# Patient Record
Sex: MALE | NOT HISPANIC OR LATINO | Employment: UNEMPLOYED | URBAN - METROPOLITAN AREA
[De-identification: names, ages, dates, MRNs, and addresses within clinical notes are randomized per-mention and may not be internally consistent; named-entity substitution may affect disease eponyms.]

---

## 2018-01-21 ENCOUNTER — HOSPITAL ENCOUNTER (OUTPATIENT)
Dept: MEDSURG UNIT | Facility: HOSPITAL | Age: 33
End: 2018-01-22
Attending: INTERNAL MEDICINE | Admitting: INTERNAL MEDICINE

## 2018-01-21 LAB — PHENYTOIN SERPL-MCNC: 8.7 MCG/ML (ref 10–20)

## 2018-01-22 LAB
ABS NEUT (OLG): 3.71 X10(3)/MCL (ref 2.1–9.2)
ALBUMIN SERPL-MCNC: 3.2 GM/DL (ref 3.4–5)
ALBUMIN/GLOB SERPL: 1 RATIO (ref 1–2)
ALP SERPL-CCNC: 108 UNIT/L (ref 45–117)
ALT SERPL-CCNC: 42 UNIT/L (ref 12–78)
AST SERPL-CCNC: 21 UNIT/L (ref 15–37)
BASOPHILS # BLD AUTO: 0.12 X10(3)/MCL
BASOPHILS NFR BLD AUTO: 2 % (ref 0–1)
BILIRUB SERPL-MCNC: 0.3 MG/DL (ref 0.2–1)
BILIRUBIN DIRECT+TOT PNL SERPL-MCNC: <0.1 MG/DL
BILIRUBIN DIRECT+TOT PNL SERPL-MCNC: ABNORMAL MG/DL
BUN SERPL-MCNC: 19 MG/DL (ref 7–18)
CALCIUM SERPL-MCNC: 8.8 MG/DL (ref 8.5–10.1)
CHLORIDE SERPL-SCNC: 103 MMOL/L (ref 98–107)
CO2 SERPL-SCNC: 28 MMOL/L (ref 21–32)
CREAT SERPL-MCNC: 0.8 MG/DL (ref 0.6–1.3)
EOSINOPHIL # BLD AUTO: 0.34 10*3/UL
EOSINOPHIL NFR BLD AUTO: 5 % (ref 0–5)
ERYTHROCYTE [DISTWIDTH] IN BLOOD BY AUTOMATED COUNT: 12.7 % (ref 11.5–14.5)
GLOBULIN SER-MCNC: 3.9 GM/ML (ref 2.3–3.5)
GLUCOSE SERPL-MCNC: 80 MG/DL (ref 74–106)
HCT VFR BLD AUTO: 46.3 % (ref 40–51)
HGB BLD-MCNC: 15.9 GM/DL (ref 13.5–17.5)
IMM GRANULOCYTES # BLD AUTO: 0.03 10*3/UL
IMM GRANULOCYTES NFR BLD AUTO: 0 %
LYMPHOCYTES # BLD AUTO: 2.33 X10(3)/MCL
LYMPHOCYTES NFR BLD AUTO: 32 % (ref 15–40)
MCH RBC QN AUTO: 31.1 PG (ref 26–34)
MCHC RBC AUTO-ENTMCNC: 34.3 GM/DL (ref 31–37)
MCV RBC AUTO: 90.4 FL (ref 80–100)
MONOCYTES # BLD AUTO: 0.76 X10(3)/MCL
MONOCYTES NFR BLD AUTO: 10 % (ref 4–12)
NEUTROPHILS # BLD AUTO: 3.71 X10(3)/MCL
NEUTROPHILS NFR BLD AUTO: 51 X10(3)/MCL
PLATELET # BLD AUTO: 279 X10(3)/MCL (ref 130–400)
PMV BLD AUTO: 9.5 FL (ref 7.4–10.4)
POTASSIUM SERPL-SCNC: 4.1 MMOL/L (ref 3.5–5.1)
PROT SERPL-MCNC: 7.1 GM/DL (ref 6.4–8.2)
RBC # BLD AUTO: 5.12 X10(6)/MCL (ref 4.5–5.9)
SODIUM SERPL-SCNC: 138 MMOL/L (ref 136–145)
WBC # SPEC AUTO: 7.3 X10(3)/MCL (ref 4.5–11)

## 2018-04-09 ENCOUNTER — HOSPITAL ENCOUNTER (OUTPATIENT)
Dept: EMERGENCY MEDICINE | Facility: HOSPITAL | Age: 33
End: 2018-04-09
Attending: INTERNAL MEDICINE | Admitting: INTERNAL MEDICINE

## 2018-04-09 LAB
ABS NEUT (OLG): 6.74 X10(3)/MCL (ref 2.1–9.2)
ALBUMIN SERPL-MCNC: 3.4 GM/DL (ref 3.4–5)
ALBUMIN/GLOB SERPL: 1 RATIO (ref 1–2)
ALP SERPL-CCNC: 128 UNIT/L (ref 45–117)
ALT SERPL-CCNC: 64 UNIT/L (ref 12–78)
AMPHET UR QL SCN: NEGATIVE
APPEARANCE, UA: CLEAR
AST SERPL-CCNC: 30 UNIT/L (ref 15–37)
BACTERIA #/AREA URNS AUTO: ABNORMAL /[HPF]
BARBITURATE SCN PRESENT UR: NEGATIVE
BASOPHILS # BLD AUTO: 0.08 X10(3)/MCL
BASOPHILS NFR BLD AUTO: 1 %
BENZODIAZ UR QL SCN: NEGATIVE
BILIRUB SERPL-MCNC: 0.3 MG/DL (ref 0.2–1)
BILIRUB UR QL STRIP: NEGATIVE
BILIRUBIN DIRECT+TOT PNL SERPL-MCNC: 0.1 MG/DL
BILIRUBIN DIRECT+TOT PNL SERPL-MCNC: 0.2 MG/DL
BUN SERPL-MCNC: 10 MG/DL (ref 7–18)
CALCIUM SERPL-MCNC: 8.6 MG/DL (ref 8.5–10.1)
CANNABINOIDS UR QL SCN: NEGATIVE
CHLORIDE SERPL-SCNC: 106 MMOL/L (ref 98–107)
CK MB SERPL-MCNC: 1.1 NG/ML (ref 1–3.6)
CK SERPL-CCNC: 244 UNIT/L (ref 39–308)
CK SERPL-CCNC: 307 UNIT/L (ref 39–308)
CO2 SERPL-SCNC: 27 MMOL/L (ref 21–32)
COCAINE UR QL SCN: NEGATIVE
COLOR UR: COLORLESS
CREAT SERPL-MCNC: 0.8 MG/DL (ref 0.6–1.3)
EOSINOPHIL # BLD AUTO: 0.06 X10(3)/MCL
EOSINOPHIL NFR BLD AUTO: 1 %
ERYTHROCYTE [DISTWIDTH] IN BLOOD BY AUTOMATED COUNT: 13.8 % (ref 11.5–14.5)
ETHANOL SERPL-MCNC: <3 MG/DL
GLOBULIN SER-MCNC: 3.7 GM/ML (ref 2.3–3.5)
GLUCOSE (UA): NORMAL
GLUCOSE SERPL-MCNC: 99 MG/DL (ref 74–106)
HCT VFR BLD AUTO: 40.5 % (ref 40–51)
HGB BLD-MCNC: 13.5 GM/DL (ref 13.5–17.5)
HGB UR QL STRIP: NEGATIVE
HYALINE CASTS #/AREA URNS LPF: ABNORMAL /[LPF]
IMM GRANULOCYTES # BLD AUTO: 0.03 10*3/UL
IMM GRANULOCYTES NFR BLD AUTO: 0 %
KETONES UR QL STRIP: NEGATIVE
LEUKOCYTE ESTERASE UR QL STRIP: NEGATIVE
LYMPHOCYTES # BLD AUTO: 1.72 X10(3)/MCL
LYMPHOCYTES NFR BLD AUTO: 18 % (ref 13–40)
MAGNESIUM SERPL-MCNC: 2 MG/DL (ref 1.8–2.4)
MCH RBC QN AUTO: 31.2 PG (ref 26–34)
MCHC RBC AUTO-ENTMCNC: 33.3 GM/DL (ref 31–37)
MCV RBC AUTO: 93.5 FL (ref 80–100)
MONOCYTES # BLD AUTO: 0.78 X10(3)/MCL
MONOCYTES NFR BLD AUTO: 8 % (ref 4–12)
NEUTROPHILS # BLD AUTO: 6.74 X10(3)/MCL
NEUTROPHILS NFR BLD AUTO: 72 X10(3)/MCL
NITRITE UR QL STRIP: NEGATIVE
OPIATES UR QL SCN: NEGATIVE
PCP UR QL: NEGATIVE
PH UR STRIP.AUTO: 7.5 [PH] (ref 5–8)
PH UR STRIP: 7.5 [PH] (ref 4.5–8)
PHOSPHATE SERPL-MCNC: 2.5 MG/DL (ref 2.5–4.9)
PLATELET # BLD AUTO: 263 X10(3)/MCL (ref 130–400)
PMV BLD AUTO: 9.1 FL (ref 7.4–10.4)
POC TROPONIN: 0 NG/ML (ref 0–0.08)
POTASSIUM SERPL-SCNC: 3.2 MMOL/L (ref 3.5–5.1)
PROT SERPL-MCNC: 7.1 GM/DL (ref 6.4–8.2)
PROT UR QL STRIP: NEGATIVE
RBC # BLD AUTO: 4.33 X10(6)/MCL (ref 4.5–5.9)
RBC #/AREA URNS AUTO: ABNORMAL /[HPF]
SODIUM SERPL-SCNC: 138 MMOL/L (ref 136–145)
SP GR UR STRIP: 1 (ref 1–1.03)
SQUAMOUS #/AREA URNS LPF: ABNORMAL /[LPF]
TEMPERATURE, URINE (OHS): 25 DEGC (ref 20–25)
UROBILINOGEN UR STRIP-ACNC: NORMAL
WBC # SPEC AUTO: 9.4 X10(3)/MCL (ref 4.5–11)
WBC #/AREA URNS AUTO: ABNORMAL /HPF

## 2020-02-27 ENCOUNTER — HOSPITAL ENCOUNTER (OUTPATIENT)
Dept: MEDSURG UNIT | Facility: HOSPITAL | Age: 35
End: 2020-02-28
Attending: SURGERY | Admitting: SURGERY

## 2020-02-27 LAB
ABS NEUT (OLG): 4.94 X10(3)/MCL (ref 2.1–9.2)
ALBUMIN SERPL-MCNC: 3.4 GM/DL (ref 3.4–5)
ALBUMIN/GLOB SERPL: 0.9 RATIO (ref 1.1–2)
ALP SERPL-CCNC: 90 UNIT/L (ref 45–117)
ALT SERPL-CCNC: 20 UNIT/L (ref 12–78)
APTT PPP: 28.6 SECOND(S) (ref 23.3–37)
AST SERPL-CCNC: 9 UNIT/L (ref 15–37)
BASOPHILS # BLD AUTO: 0.1 X10(3)/MCL (ref 0–0.2)
BASOPHILS NFR BLD AUTO: 1 %
BILIRUB SERPL-MCNC: 0.2 MG/DL (ref 0.2–1)
BILIRUBIN DIRECT+TOT PNL SERPL-MCNC: <0.1 MG/DL (ref 0–0.2)
BILIRUBIN DIRECT+TOT PNL SERPL-MCNC: ABNORMAL MG/DL
BUN SERPL-MCNC: 15 MG/DL (ref 7–18)
CALCIUM SERPL-MCNC: 8.9 MG/DL (ref 8.5–10.1)
CHLORIDE SERPL-SCNC: 108 MMOL/L (ref 98–107)
CO2 SERPL-SCNC: 29 MMOL/L (ref 21–32)
CREAT SERPL-MCNC: 0.9 MG/DL (ref 0.6–1.3)
EOSINOPHIL # BLD AUTO: 0.2 X10(3)/MCL (ref 0–0.9)
EOSINOPHIL NFR BLD AUTO: 2 %
ERYTHROCYTE [DISTWIDTH] IN BLOOD BY AUTOMATED COUNT: 13 % (ref 11.5–14.5)
GLOBULIN SER-MCNC: 3.6 GM/ML (ref 2.3–3.5)
GLUCOSE SERPL-MCNC: 95 MG/DL (ref 74–106)
HCT VFR BLD AUTO: 49.4 % (ref 40–51)
HGB BLD-MCNC: 16.5 GM/DL (ref 13.5–17.5)
IMM GRANULOCYTES # BLD AUTO: 0.02 10*3/UL
IMM GRANULOCYTES NFR BLD AUTO: 0 %
INR PPP: 1 (ref 0.9–1.2)
LYMPHOCYTES # BLD AUTO: 1.7 X10(3)/MCL (ref 0.6–4.6)
LYMPHOCYTES NFR BLD AUTO: 22 %
MCH RBC QN AUTO: 31.1 PG (ref 26–34)
MCHC RBC AUTO-ENTMCNC: 33.4 GM/DL (ref 31–37)
MCV RBC AUTO: 93.2 FL (ref 80–100)
MONOCYTES # BLD AUTO: 0.7 X10(3)/MCL (ref 0.1–1.3)
MONOCYTES NFR BLD AUTO: 9 %
NEUTROPHILS # BLD AUTO: 4.94 X10(3)/MCL (ref 2.1–9.2)
NEUTROPHILS NFR BLD AUTO: 65 %
PLATELET # BLD AUTO: 224 X10(3)/MCL (ref 130–400)
PMV BLD AUTO: 9.8 FL (ref 7.4–10.4)
POTASSIUM SERPL-SCNC: 3.9 MMOL/L (ref 3.5–5.1)
PROT SERPL-MCNC: 7 GM/DL (ref 6.4–8.2)
PROTHROMBIN TIME: 13.1 SECOND(S) (ref 11.9–14.4)
RBC # BLD AUTO: 5.3 X10(6)/MCL (ref 4.5–5.9)
SODIUM SERPL-SCNC: 140 MMOL/L (ref 136–145)
WBC # SPEC AUTO: 7.6 X10(3)/MCL (ref 4.5–11)

## 2020-02-28 LAB
ABS NEUT (OLG): 4.22 X10(3)/MCL (ref 2.1–9.2)
ALBUMIN SERPL-MCNC: 3 GM/DL (ref 3.4–5)
ALBUMIN/GLOB SERPL: 1 RATIO (ref 1.1–2)
ALP SERPL-CCNC: 76 UNIT/L (ref 45–117)
ALT SERPL-CCNC: 17 UNIT/L (ref 12–78)
AST SERPL-CCNC: 7 UNIT/L (ref 15–37)
BASOPHILS # BLD AUTO: 0.1 X10(3)/MCL (ref 0–0.2)
BASOPHILS NFR BLD AUTO: 1 %
BILIRUB SERPL-MCNC: 0.4 MG/DL (ref 0.2–1)
BILIRUBIN DIRECT+TOT PNL SERPL-MCNC: 0.1 MG/DL (ref 0–0.2)
BILIRUBIN DIRECT+TOT PNL SERPL-MCNC: 0.3 MG/DL
BUN SERPL-MCNC: 14 MG/DL (ref 7–18)
CALCIUM SERPL-MCNC: 8.7 MG/DL (ref 8.5–10.1)
CHLORIDE SERPL-SCNC: 109 MMOL/L (ref 98–107)
CO2 SERPL-SCNC: 30 MMOL/L (ref 21–32)
CREAT SERPL-MCNC: 0.8 MG/DL (ref 0.6–1.3)
EOSINOPHIL # BLD AUTO: 0.3 X10(3)/MCL (ref 0–0.9)
EOSINOPHIL NFR BLD AUTO: 4 %
ERYTHROCYTE [DISTWIDTH] IN BLOOD BY AUTOMATED COUNT: 12.9 % (ref 11.5–14.5)
GLOBULIN SER-MCNC: 3.1 GM/ML (ref 2.3–3.5)
GLUCOSE SERPL-MCNC: 82 MG/DL (ref 74–106)
HCT VFR BLD AUTO: 47.3 % (ref 40–51)
HGB BLD-MCNC: 15.5 GM/DL (ref 13.5–17.5)
IMM GRANULOCYTES # BLD AUTO: 0.01 10*3/UL
IMM GRANULOCYTES NFR BLD AUTO: 0 %
INR PPP: 1.05 (ref 0.9–1.2)
LYMPHOCYTES # BLD AUTO: 2.7 X10(3)/MCL (ref 0.6–4.6)
LYMPHOCYTES NFR BLD AUTO: 33 %
MCH RBC QN AUTO: 30.6 PG (ref 26–34)
MCHC RBC AUTO-ENTMCNC: 32.8 GM/DL (ref 31–37)
MCV RBC AUTO: 93.3 FL (ref 80–100)
MONOCYTES # BLD AUTO: 0.8 X10(3)/MCL (ref 0.1–1.3)
MONOCYTES NFR BLD AUTO: 10 %
NEUTROPHILS # BLD AUTO: 4.22 X10(3)/MCL (ref 2.1–9.2)
NEUTROPHILS NFR BLD AUTO: 52 %
PLATELET # BLD AUTO: 225 X10(3)/MCL (ref 130–400)
PMV BLD AUTO: 10.3 FL (ref 7.4–10.4)
POTASSIUM SERPL-SCNC: 3.9 MMOL/L (ref 3.5–5.1)
PROT SERPL-MCNC: 6.1 GM/DL (ref 6.4–8.2)
PROTHROMBIN TIME: 13.6 SECOND(S) (ref 11.9–14.4)
RBC # BLD AUTO: 5.07 X10(6)/MCL (ref 4.5–5.9)
SODIUM SERPL-SCNC: 142 MMOL/L (ref 136–145)
WBC # SPEC AUTO: 8.1 X10(3)/MCL (ref 4.5–11)

## 2020-04-27 ENCOUNTER — HOSPITAL ENCOUNTER (OUTPATIENT)
Dept: MEDSURG UNIT | Facility: HOSPITAL | Age: 35
End: 2020-04-29
Attending: SURGERY | Admitting: SURGERY

## 2020-04-27 LAB
ABS NEUT (OLG): 7.95 X10(3)/MCL (ref 2.1–9.2)
ALBUMIN SERPL-MCNC: 3.7 GM/DL (ref 3.4–5)
ALBUMIN/GLOB SERPL: 0.9 RATIO (ref 1.1–2)
ALP SERPL-CCNC: 116 UNIT/L (ref 45–117)
ALT SERPL-CCNC: 40 UNIT/L (ref 12–78)
APTT PPP: 30.4 SECOND(S) (ref 23.3–37)
AST SERPL-CCNC: 24 UNIT/L (ref 15–37)
BASOPHILS # BLD AUTO: 0.1 X10(3)/MCL (ref 0–0.2)
BASOPHILS NFR BLD AUTO: 1 %
BILIRUB SERPL-MCNC: 0.2 MG/DL (ref 0.2–1)
BILIRUBIN DIRECT+TOT PNL SERPL-MCNC: <0.1 MG/DL (ref 0–0.2)
BILIRUBIN DIRECT+TOT PNL SERPL-MCNC: ABNORMAL MG/DL
BUN SERPL-MCNC: 22 MG/DL (ref 7–18)
CALCIUM SERPL-MCNC: 9.1 MG/DL (ref 8.5–10.1)
CHLORIDE SERPL-SCNC: 110 MMOL/L (ref 98–107)
CO2 SERPL-SCNC: 27 MMOL/L (ref 21–32)
CREAT SERPL-MCNC: 1.2 MG/DL (ref 0.6–1.3)
EOSINOPHIL # BLD AUTO: 0.3 X10(3)/MCL (ref 0–0.9)
EOSINOPHIL NFR BLD AUTO: 2 %
ERYTHROCYTE [DISTWIDTH] IN BLOOD BY AUTOMATED COUNT: 13.3 % (ref 11.5–14.5)
GLOBULIN SER-MCNC: 4.1 GM/ML (ref 2.3–3.5)
GLUCOSE SERPL-MCNC: 104 MG/DL (ref 74–106)
GROUP & RH: NORMAL
HCT VFR BLD AUTO: 41.8 % (ref 40–51)
HGB BLD-MCNC: 14.2 GM/DL (ref 13.5–17.5)
IMM GRANULOCYTES # BLD AUTO: 0.04 10*3/UL
IMM GRANULOCYTES NFR BLD AUTO: 0 %
INR PPP: 0.94 (ref 0.9–1.2)
LYMPHOCYTES # BLD AUTO: 1.8 X10(3)/MCL (ref 0.6–4.6)
LYMPHOCYTES NFR BLD AUTO: 16 %
MCH RBC QN AUTO: 30.9 PG (ref 26–34)
MCHC RBC AUTO-ENTMCNC: 34 GM/DL (ref 31–37)
MCV RBC AUTO: 90.9 FL (ref 80–100)
MONOCYTES # BLD AUTO: 1 X10(3)/MCL (ref 0.1–1.3)
MONOCYTES NFR BLD AUTO: 9 %
NEUTROPHILS # BLD AUTO: 7.95 X10(3)/MCL (ref 2.1–9.2)
NEUTROPHILS NFR BLD AUTO: 72 %
PLATELET # BLD AUTO: 382 X10(3)/MCL (ref 130–400)
PMV BLD AUTO: 9.1 FL (ref 7.4–10.4)
POTASSIUM SERPL-SCNC: 3.7 MMOL/L (ref 3.5–5.1)
PROT SERPL-MCNC: 7.8 GM/DL (ref 6.4–8.2)
PROTHROMBIN TIME: 12.5 SECOND(S) (ref 11.9–14.4)
RBC # BLD AUTO: 4.6 X10(6)/MCL (ref 4.5–5.9)
SARS-COV-2 RNA RESP QL NAA+PROBE: NOT DETECTED
SODIUM SERPL-SCNC: 143 MMOL/L (ref 136–145)
WBC # SPEC AUTO: 11.1 X10(3)/MCL (ref 4.5–11)

## 2020-04-28 LAB
ABS NEUT (OLG): 2.73 X10(3)/MCL (ref 2.1–9.2)
AMPHET UR QL SCN: NEGATIVE
BARBITURATE SCN PRESENT UR: NEGATIVE
BASOPHILS # BLD AUTO: 0.1 X10(3)/MCL (ref 0–0.2)
BASOPHILS NFR BLD AUTO: 2 %
BENZODIAZ UR QL SCN: NEGATIVE
BUN SERPL-MCNC: 20 MG/DL (ref 7–18)
CALCIUM SERPL-MCNC: 8.9 MG/DL (ref 8.5–10.1)
CANNABINOIDS UR QL SCN: NEGATIVE
CHLORIDE SERPL-SCNC: 112 MMOL/L (ref 98–107)
CO2 SERPL-SCNC: 25 MMOL/L (ref 21–32)
COCAINE UR QL SCN: NEGATIVE
CREAT SERPL-MCNC: 0.8 MG/DL (ref 0.6–1.3)
CREAT/UREA NIT SERPL: 25
EOSINOPHIL # BLD AUTO: 0.4 X10(3)/MCL (ref 0–0.9)
EOSINOPHIL NFR BLD AUTO: 7 %
ERYTHROCYTE [DISTWIDTH] IN BLOOD BY AUTOMATED COUNT: 13.4 % (ref 11.5–14.5)
GLUCOSE SERPL-MCNC: 90 MG/DL (ref 74–106)
HCT VFR BLD AUTO: 38.5 % (ref 40–51)
HGB BLD-MCNC: 12.7 GM/DL (ref 13.5–17.5)
IMM GRANULOCYTES # BLD AUTO: 0.02 10*3/UL
IMM GRANULOCYTES NFR BLD AUTO: 0 %
LYMPHOCYTES # BLD AUTO: 2.4 X10(3)/MCL (ref 0.6–4.6)
LYMPHOCYTES NFR BLD AUTO: 37 %
MCH RBC QN AUTO: 30.2 PG (ref 26–34)
MCHC RBC AUTO-ENTMCNC: 33 GM/DL (ref 31–37)
MCV RBC AUTO: 91.7 FL (ref 80–100)
MONOCYTES # BLD AUTO: 0.8 X10(3)/MCL (ref 0.1–1.3)
MONOCYTES NFR BLD AUTO: 12 %
NEUTROPHILS # BLD AUTO: 2.73 X10(3)/MCL (ref 2.1–9.2)
NEUTROPHILS NFR BLD AUTO: 42 %
OPIATES UR QL SCN: NEGATIVE
PCP UR QL: NEGATIVE
PH UR STRIP.AUTO: 5 [PH] (ref 5–8)
PLATELET # BLD AUTO: 326 X10(3)/MCL (ref 130–400)
PMV BLD AUTO: 9.3 FL (ref 7.4–10.4)
POTASSIUM SERPL-SCNC: 3.4 MMOL/L (ref 3.5–5.1)
RBC # BLD AUTO: 4.2 X10(6)/MCL (ref 4.5–5.9)
SODIUM SERPL-SCNC: 144 MMOL/L (ref 136–145)
TEMPERATURE, URINE (OHS): 24 DEGC (ref 20–25)
WBC # SPEC AUTO: 6.5 X10(3)/MCL (ref 4.5–11)

## 2020-04-29 LAB
ABS NEUT (OLG): 2.92 X10(3)/MCL (ref 2.1–9.2)
BASOPHILS # BLD AUTO: 0.1 X10(3)/MCL (ref 0–0.2)
BASOPHILS NFR BLD AUTO: 2 %
BUN SERPL-MCNC: 13 MG/DL (ref 7–18)
CALCIUM SERPL-MCNC: 9.1 MG/DL (ref 8.5–10.1)
CHLORIDE SERPL-SCNC: 109 MMOL/L (ref 98–107)
CO2 SERPL-SCNC: 27 MMOL/L (ref 21–32)
CREAT SERPL-MCNC: 0.8 MG/DL (ref 0.6–1.3)
CREAT/UREA NIT SERPL: 16
EOSINOPHIL # BLD AUTO: 0.4 X10(3)/MCL (ref 0–0.9)
EOSINOPHIL NFR BLD AUTO: 6 %
ERYTHROCYTE [DISTWIDTH] IN BLOOD BY AUTOMATED COUNT: 13.2 % (ref 11.5–14.5)
GLUCOSE SERPL-MCNC: 86 MG/DL (ref 74–106)
HCT VFR BLD AUTO: 41.6 % (ref 40–51)
HGB BLD-MCNC: 13.9 GM/DL (ref 13.5–17.5)
IMM GRANULOCYTES # BLD AUTO: 0.01 10*3/UL
IMM GRANULOCYTES NFR BLD AUTO: 0 %
LYMPHOCYTES # BLD AUTO: 1.9 X10(3)/MCL (ref 0.6–4.6)
LYMPHOCYTES NFR BLD AUTO: 33 %
MCH RBC QN AUTO: 30.3 PG (ref 26–34)
MCHC RBC AUTO-ENTMCNC: 33.4 GM/DL (ref 31–37)
MCV RBC AUTO: 90.8 FL (ref 80–100)
MONOCYTES # BLD AUTO: 0.6 X10(3)/MCL (ref 0.1–1.3)
MONOCYTES NFR BLD AUTO: 10 %
NEUTROPHILS # BLD AUTO: 2.92 X10(3)/MCL (ref 2.1–9.2)
NEUTROPHILS NFR BLD AUTO: 50 %
PLATELET # BLD AUTO: 314 X10(3)/MCL (ref 130–400)
PMV BLD AUTO: 9.4 FL (ref 7.4–10.4)
POTASSIUM SERPL-SCNC: 3.5 MMOL/L (ref 3.5–5.1)
RBC # BLD AUTO: 4.58 X10(6)/MCL (ref 4.5–5.9)
SODIUM SERPL-SCNC: 142 MMOL/L (ref 136–145)
WBC # SPEC AUTO: 5.9 X10(3)/MCL (ref 4.5–11)

## 2020-05-01 ENCOUNTER — HOSPITAL ENCOUNTER (OUTPATIENT)
Dept: MEDSURG UNIT | Facility: HOSPITAL | Age: 35
End: 2020-05-04
Attending: SURGERY | Admitting: SURGERY

## 2020-05-01 LAB
ABS NEUT (OLG): 3.82 X10(3)/MCL (ref 2.1–9.2)
ALBUMIN SERPL-MCNC: 3.7 GM/DL (ref 3.5–5)
ALBUMIN/GLOB SERPL: 1.1 RATIO (ref 1.1–2)
ALP SERPL-CCNC: 87 UNIT/L (ref 40–150)
ALT SERPL-CCNC: 19 UNIT/L (ref 0–55)
AST SERPL-CCNC: 15 UNIT/L (ref 5–34)
BASOPHILS # BLD AUTO: 0.1 X10(3)/MCL (ref 0–0.2)
BASOPHILS NFR BLD AUTO: 2 %
BILIRUB SERPL-MCNC: 0.3 MG/DL
BILIRUBIN DIRECT+TOT PNL SERPL-MCNC: 0.1 MG/DL (ref 0–0.5)
BILIRUBIN DIRECT+TOT PNL SERPL-MCNC: 0.2 MG/DL (ref 0–0.8)
BUN SERPL-MCNC: 16 MG/DL (ref 8.9–20.6)
CALCIUM SERPL-MCNC: 9.1 MG/DL (ref 8.4–10.2)
CHLORIDE SERPL-SCNC: 109 MMOL/L (ref 98–107)
CO2 SERPL-SCNC: 24 MMOL/L (ref 22–29)
CREAT SERPL-MCNC: 0.79 MG/DL (ref 0.73–1.18)
EOSINOPHIL # BLD AUTO: 0.3 X10(3)/MCL (ref 0–0.9)
EOSINOPHIL NFR BLD AUTO: 5 %
ERYTHROCYTE [DISTWIDTH] IN BLOOD BY AUTOMATED COUNT: 13.8 % (ref 11.5–17)
GLOBULIN SER-MCNC: 3.5 GM/DL (ref 2.4–3.5)
GLUCOSE SERPL-MCNC: 89 MG/DL (ref 74–100)
HCT VFR BLD AUTO: 42.6 % (ref 42–52)
HGB BLD-MCNC: 13.7 GM/DL (ref 14–18)
LYMPHOCYTES # BLD AUTO: 2.4 X10(3)/MCL (ref 0.6–4.6)
LYMPHOCYTES NFR BLD AUTO: 32 %
MCH RBC QN AUTO: 30 PG (ref 27–31)
MCHC RBC AUTO-ENTMCNC: 32.2 GM/DL (ref 33–36)
MCV RBC AUTO: 93.4 FL (ref 80–94)
MONOCYTES # BLD AUTO: 0.8 X10(3)/MCL (ref 0.1–1.3)
MONOCYTES NFR BLD AUTO: 10 %
NEUTROPHILS # BLD AUTO: 3.82 X10(3)/MCL (ref 2.1–9.2)
NEUTROPHILS NFR BLD AUTO: 52 %
PLATELET # BLD AUTO: 328 X10(3)/MCL (ref 130–400)
PMV BLD AUTO: 9.5 FL (ref 9.4–12.4)
POTASSIUM SERPL-SCNC: 3.6 MMOL/L (ref 3.5–5.1)
PROT SERPL-MCNC: 7.2 GM/DL (ref 6.4–8.3)
RBC # BLD AUTO: 4.56 X10(6)/MCL (ref 4.7–6.1)
SODIUM SERPL-SCNC: 144 MMOL/L (ref 136–145)
WBC # SPEC AUTO: 7.4 X10(3)/MCL (ref 4.5–11.5)

## 2020-05-02 LAB
ABS NEUT (OLG): 2.6 X10(3)/MCL (ref 2.1–9.2)
ALBUMIN SERPL-MCNC: 3.2 GM/DL (ref 3.5–5)
ALBUMIN/GLOB SERPL: 1.1 RATIO (ref 1.1–2)
ALP SERPL-CCNC: 74 UNIT/L (ref 40–150)
ALT SERPL-CCNC: 15 UNIT/L (ref 0–55)
AST SERPL-CCNC: 16 UNIT/L (ref 5–34)
BASOPHILS # BLD AUTO: 0.1 X10(3)/MCL (ref 0–0.2)
BASOPHILS NFR BLD AUTO: 2 %
BILIRUB SERPL-MCNC: 0.4 MG/DL
BILIRUBIN DIRECT+TOT PNL SERPL-MCNC: 0.2 MG/DL (ref 0–0.5)
BILIRUBIN DIRECT+TOT PNL SERPL-MCNC: 0.2 MG/DL (ref 0–0.8)
BUN SERPL-MCNC: 11 MG/DL (ref 8.9–20.6)
CALCIUM SERPL-MCNC: 8.5 MG/DL (ref 8.4–10.2)
CHLORIDE SERPL-SCNC: 111 MMOL/L (ref 98–107)
CO2 SERPL-SCNC: 22 MMOL/L (ref 22–29)
CREAT SERPL-MCNC: 0.69 MG/DL (ref 0.73–1.18)
EOSINOPHIL # BLD AUTO: 0.4 X10(3)/MCL (ref 0–0.9)
EOSINOPHIL NFR BLD AUTO: 7 %
ERYTHROCYTE [DISTWIDTH] IN BLOOD BY AUTOMATED COUNT: 13.6 % (ref 11.5–17)
GLOBULIN SER-MCNC: 2.9 GM/DL (ref 2.4–3.5)
GLUCOSE SERPL-MCNC: 81 MG/DL (ref 74–100)
HCT VFR BLD AUTO: 39.6 % (ref 42–52)
HGB BLD-MCNC: 12.6 GM/DL (ref 14–18)
LYMPHOCYTES # BLD AUTO: 2.7 X10(3)/MCL (ref 0.6–4.6)
LYMPHOCYTES NFR BLD AUTO: 43 %
MCH RBC QN AUTO: 29.6 PG (ref 27–31)
MCHC RBC AUTO-ENTMCNC: 31.8 GM/DL (ref 33–36)
MCV RBC AUTO: 93.2 FL (ref 80–94)
MONOCYTES # BLD AUTO: 0.4 X10(3)/MCL (ref 0.1–1.3)
MONOCYTES NFR BLD AUTO: 6 %
NEUTROPHILS # BLD AUTO: 2.6 X10(3)/MCL (ref 2.1–9.2)
NEUTROPHILS NFR BLD AUTO: 41 %
PLATELET # BLD AUTO: 308 X10(3)/MCL (ref 130–400)
PMV BLD AUTO: 10 FL (ref 9.4–12.4)
POTASSIUM SERPL-SCNC: 3.3 MMOL/L (ref 3.5–5.1)
PROT SERPL-MCNC: 6.1 GM/DL (ref 6.4–8.3)
RBC # BLD AUTO: 4.25 X10(6)/MCL (ref 4.7–6.1)
SODIUM SERPL-SCNC: 143 MMOL/L (ref 136–145)
WBC # SPEC AUTO: 6.3 X10(3)/MCL (ref 4.5–11.5)

## 2020-05-04 LAB
ABS NEUT (OLG): 2.46 X10(3)/MCL (ref 2.1–9.2)
ALBUMIN SERPL-MCNC: 3.4 GM/DL (ref 3.5–5)
ALBUMIN/GLOB SERPL: 1.1 RATIO (ref 1.1–2)
ALP SERPL-CCNC: 76 UNIT/L (ref 40–150)
ALT SERPL-CCNC: 13 UNIT/L (ref 0–55)
AST SERPL-CCNC: 12 UNIT/L (ref 5–34)
BASOPHILS # BLD AUTO: 0.1 X10(3)/MCL (ref 0–0.2)
BASOPHILS NFR BLD AUTO: 2 %
BILIRUB SERPL-MCNC: 0.5 MG/DL
BILIRUBIN DIRECT+TOT PNL SERPL-MCNC: 0.1 MG/DL (ref 0–0.5)
BILIRUBIN DIRECT+TOT PNL SERPL-MCNC: 0.4 MG/DL (ref 0–0.8)
BUN SERPL-MCNC: 6 MG/DL (ref 8.9–20.6)
CALCIUM SERPL-MCNC: 8.6 MG/DL (ref 8.4–10.2)
CHLORIDE SERPL-SCNC: 108 MMOL/L (ref 98–107)
CO2 SERPL-SCNC: 23 MMOL/L (ref 22–29)
CREAT SERPL-MCNC: 0.77 MG/DL (ref 0.73–1.18)
EOSINOPHIL # BLD AUTO: 0.4 X10(3)/MCL (ref 0–0.9)
EOSINOPHIL NFR BLD AUTO: 7 %
ERYTHROCYTE [DISTWIDTH] IN BLOOD BY AUTOMATED COUNT: 13.7 % (ref 11.5–17)
GLOBULIN SER-MCNC: 3 GM/DL (ref 2.4–3.5)
GLUCOSE SERPL-MCNC: 79 MG/DL (ref 74–100)
HCT VFR BLD AUTO: 44.4 % (ref 42–52)
HGB BLD-MCNC: 14.3 GM/DL (ref 14–18)
LYMPHOCYTES # BLD AUTO: 2.6 X10(3)/MCL (ref 0.6–4.6)
LYMPHOCYTES NFR BLD AUTO: 42 %
MCH RBC QN AUTO: 30.1 PG (ref 27–31)
MCHC RBC AUTO-ENTMCNC: 32.2 GM/DL (ref 33–36)
MCV RBC AUTO: 93.5 FL (ref 80–94)
MONOCYTES # BLD AUTO: 0.6 X10(3)/MCL (ref 0.1–1.3)
MONOCYTES NFR BLD AUTO: 9 %
NEUTROPHILS # BLD AUTO: 2.46 X10(3)/MCL (ref 2.1–9.2)
NEUTROPHILS NFR BLD AUTO: 40 %
PLATELET # BLD AUTO: 306 X10(3)/MCL (ref 130–400)
PMV BLD AUTO: 9.6 FL (ref 9.4–12.4)
POTASSIUM SERPL-SCNC: 3.9 MMOL/L (ref 3.5–5.1)
PROT SERPL-MCNC: 6.4 GM/DL (ref 6.4–8.3)
RBC # BLD AUTO: 4.75 X10(6)/MCL (ref 4.7–6.1)
SODIUM SERPL-SCNC: 142 MMOL/L (ref 136–145)
WBC # SPEC AUTO: 6.1 X10(3)/MCL (ref 4.5–11.5)

## 2020-05-06 ENCOUNTER — HOSPITAL ENCOUNTER (OUTPATIENT)
Dept: MEDSURG UNIT | Facility: HOSPITAL | Age: 35
End: 2020-05-06
Attending: INTERNAL MEDICINE | Admitting: INTERNAL MEDICINE

## 2020-05-06 LAB
ABS NEUT (OLG): 4.67 X10(3)/MCL (ref 2.1–9.2)
ALBUMIN SERPL-MCNC: 3.5 GM/DL (ref 3.5–5)
ALBUMIN/GLOB SERPL: 1.2 RATIO (ref 1.1–2)
ALP SERPL-CCNC: 77 UNIT/L (ref 40–150)
ALT SERPL-CCNC: 12 UNIT/L (ref 0–55)
APPEARANCE, UA: CLEAR
AST SERPL-CCNC: 12 UNIT/L (ref 5–34)
BACTERIA SPEC CULT: ABNORMAL /HPF
BASOPHILS # BLD AUTO: 0.1 X10(3)/MCL (ref 0–0.2)
BASOPHILS NFR BLD AUTO: 1 %
BILIRUB SERPL-MCNC: <1 MG/DL
BILIRUB UR QL STRIP: NEGATIVE
BILIRUBIN DIRECT+TOT PNL SERPL-MCNC: 0.1 MG/DL (ref 0–0.5)
BILIRUBIN DIRECT+TOT PNL SERPL-MCNC: <0.9 MG/DL (ref 0–0.8)
BUN SERPL-MCNC: 10 MG/DL (ref 8.9–20.6)
CALCIUM SERPL-MCNC: 8.7 MG/DL (ref 8.4–10.2)
CHLORIDE SERPL-SCNC: 108 MMOL/L (ref 98–107)
CO2 SERPL-SCNC: 23 MMOL/L (ref 22–29)
COLOR UR: YELLOW
CREAT SERPL-MCNC: 0.76 MG/DL (ref 0.73–1.18)
EOSINOPHIL # BLD AUTO: 0.3 X10(3)/MCL (ref 0–0.9)
EOSINOPHIL NFR BLD AUTO: 4 %
ERYTHROCYTE [DISTWIDTH] IN BLOOD BY AUTOMATED COUNT: 13.8 % (ref 11.5–17)
GLOBULIN SER-MCNC: 3 GM/DL (ref 2.4–3.5)
GLUCOSE (UA): NEGATIVE
GLUCOSE SERPL-MCNC: 83 MG/DL (ref 74–100)
GROUP & RH: NORMAL
HCT VFR BLD AUTO: 42.2 % (ref 42–52)
HCT VFR BLD AUTO: 45.6 % (ref 42–52)
HGB BLD-MCNC: 13.4 GM/DL (ref 14–18)
HGB BLD-MCNC: 14.7 GM/DL (ref 14–18)
HGB UR QL STRIP: ABNORMAL
KETONES UR QL STRIP: NEGATIVE
LEUKOCYTE ESTERASE UR QL STRIP: NEGATIVE
LIPASE SERPL-CCNC: 20 U/L
LYMPHOCYTES # BLD AUTO: 2.6 X10(3)/MCL (ref 0.6–4.6)
LYMPHOCYTES NFR BLD AUTO: 31 %
MCH RBC QN AUTO: 30 PG (ref 27–31)
MCHC RBC AUTO-ENTMCNC: 31.8 GM/DL (ref 33–36)
MCV RBC AUTO: 94.4 FL (ref 80–94)
MONOCYTES # BLD AUTO: 0.7 X10(3)/MCL (ref 0.1–1.3)
MONOCYTES NFR BLD AUTO: 8 %
NEUTROPHILS # BLD AUTO: 4.67 X10(3)/MCL (ref 2.1–9.2)
NEUTROPHILS NFR BLD AUTO: 55 %
NITRITE UR QL STRIP: NEGATIVE
PH UR STRIP: 6 [PH] (ref 5–9)
PLATELET # BLD AUTO: 297 X10(3)/MCL (ref 130–400)
PMV BLD AUTO: 9.5 FL (ref 9.4–12.4)
POTASSIUM SERPL-SCNC: 4 MMOL/L (ref 3.5–5.1)
PRODUCT READY: NORMAL
PROT SERPL-MCNC: 6.5 GM/DL (ref 6.4–8.3)
PROT UR QL STRIP: NEGATIVE
RBC # BLD AUTO: 4.47 X10(6)/MCL (ref 4.7–6.1)
RBC #/AREA URNS HPF: 263 /HPF (ref 0–2)
SODIUM SERPL-SCNC: 140 MMOL/L (ref 136–145)
SP GR UR STRIP: 1.03 (ref 1–1.03)
SQUAMOUS EPITHELIAL, UA: ABNORMAL
UROBILINOGEN UR STRIP-ACNC: 0.2
WBC # SPEC AUTO: 8.5 X10(3)/MCL (ref 4.5–11.5)
WBC #/AREA URNS HPF: ABNORMAL /HPF

## 2020-05-07 LAB
FINAL CULTURE: NORMAL
FINAL CULTURE: NORMAL

## 2020-10-08 ENCOUNTER — LAB VISIT (OUTPATIENT)
Dept: PRIMARY CARE CLINIC | Facility: OTHER | Age: 35
End: 2020-10-08
Attending: INTERNAL MEDICINE
Payer: MEDICAID

## 2020-10-08 DIAGNOSIS — Z03.818 ENCOUNTER FOR OBSERVATION FOR SUSPECTED EXPOSURE TO OTHER BIOLOGICAL AGENTS RULED OUT: ICD-10-CM

## 2020-10-08 PROCEDURE — U0003 INFECTIOUS AGENT DETECTION BY NUCLEIC ACID (DNA OR RNA); SEVERE ACUTE RESPIRATORY SYNDROME CORONAVIRUS 2 (SARS-COV-2) (CORONAVIRUS DISEASE [COVID-19]), AMPLIFIED PROBE TECHNIQUE, MAKING USE OF HIGH THROUGHPUT TECHNOLOGIES AS DESCRIBED BY CMS-2020-01-R: HCPCS

## 2020-10-09 LAB — SARS-COV-2 RNA RESP QL NAA+PROBE: NOT DETECTED

## 2021-03-16 ENCOUNTER — LAB VISIT (OUTPATIENT)
Dept: PRIMARY CARE CLINIC | Facility: OTHER | Age: 36
End: 2021-03-16
Attending: INTERNAL MEDICINE
Payer: MEDICAID

## 2021-03-16 DIAGNOSIS — Z20.822 ENCOUNTER FOR LABORATORY TESTING FOR COVID-19 VIRUS: ICD-10-CM

## 2021-03-16 PROCEDURE — U0003 INFECTIOUS AGENT DETECTION BY NUCLEIC ACID (DNA OR RNA); SEVERE ACUTE RESPIRATORY SYNDROME CORONAVIRUS 2 (SARS-COV-2) (CORONAVIRUS DISEASE [COVID-19]), AMPLIFIED PROBE TECHNIQUE, MAKING USE OF HIGH THROUGHPUT TECHNOLOGIES AS DESCRIBED BY CMS-2020-01-R: HCPCS | Performed by: INTERNAL MEDICINE

## 2021-03-17 LAB — SARS-COV-2 RNA RESP QL NAA+PROBE: NOT DETECTED

## 2021-04-20 ENCOUNTER — LAB VISIT (OUTPATIENT)
Dept: PRIMARY CARE CLINIC | Facility: OTHER | Age: 36
End: 2021-04-20
Attending: INTERNAL MEDICINE
Payer: MEDICAID

## 2021-04-20 DIAGNOSIS — Z20.822 ENCOUNTER FOR LABORATORY TESTING FOR COVID-19 VIRUS: ICD-10-CM

## 2021-04-20 PROCEDURE — U0003 INFECTIOUS AGENT DETECTION BY NUCLEIC ACID (DNA OR RNA); SEVERE ACUTE RESPIRATORY SYNDROME CORONAVIRUS 2 (SARS-COV-2) (CORONAVIRUS DISEASE [COVID-19]), AMPLIFIED PROBE TECHNIQUE, MAKING USE OF HIGH THROUGHPUT TECHNOLOGIES AS DESCRIBED BY CMS-2020-01-R: HCPCS | Performed by: INTERNAL MEDICINE

## 2021-04-22 ENCOUNTER — HOSPITAL ENCOUNTER (EMERGENCY)
Facility: OTHER | Age: 36
Discharge: HOME | End: 2021-04-22
Attending: EMERGENCY MEDICINE
Payer: MEDICAID

## 2021-04-22 VITALS
OXYGEN SATURATION: 98 % | TEMPERATURE: 98 F | SYSTOLIC BLOOD PRESSURE: 112 MMHG | DIASTOLIC BLOOD PRESSURE: 78 MMHG | HEART RATE: 95 BPM

## 2021-04-22 DIAGNOSIS — Z87.898 HISTORY OF SEIZURES: ICD-10-CM

## 2021-04-22 DIAGNOSIS — R56.9 SEIZURE-LIKE ACTIVITY: Primary | ICD-10-CM

## 2021-04-22 LAB
HCO3 UR-SCNC: 19.7 MMOL/L (ref 24–28)
HCT VFR BLD CALC: 28 %PCV (ref 36–54)
HGB BLD-MCNC: 10 G/DL
PCO2 BLDA: 14.7 MMHG (ref 35–45)
PH SMN: 7.74 [PH] (ref 7.35–7.45)
PO2 BLDA: 197 MMHG (ref 40–60)
POC BE: 0 MMOL/L
POC IONIZED CALCIUM: 1.61 MMOL/L (ref 1.06–1.42)
POC SATURATED O2: 100 % (ref 95–100)
POC TCO2: 20 MMOL/L (ref 24–29)
POTASSIUM BLD-SCNC: 4.1 MMOL/L (ref 3.5–5.1)
SAMPLE: ABNORMAL
SARS-COV-2 RNA RESP QL NAA+PROBE: NOT DETECTED
SITE: ABNORMAL
SODIUM BLD-SCNC: 140 MMOL/L (ref 136–145)

## 2021-04-22 PROCEDURE — 82803 BLOOD GASES ANY COMBINATION: CPT

## 2021-04-22 PROCEDURE — 99900035 HC TECH TIME PER 15 MIN (STAT)

## 2021-04-22 PROCEDURE — 99284 EMERGENCY DEPT VISIT MOD MDM: CPT | Mod: 25

## 2021-04-22 PROCEDURE — 96365 THER/PROPH/DIAG IV INF INIT: CPT

## 2021-04-22 PROCEDURE — 84295 ASSAY OF SERUM SODIUM: CPT

## 2021-04-22 PROCEDURE — 82565 ASSAY OF CREATININE: CPT

## 2021-04-22 PROCEDURE — 63600175 PHARM REV CODE 636 W HCPCS: Performed by: STUDENT IN AN ORGANIZED HEALTH CARE EDUCATION/TRAINING PROGRAM

## 2021-04-22 PROCEDURE — 84132 ASSAY OF SERUM POTASSIUM: CPT

## 2021-04-22 PROCEDURE — 25000003 PHARM REV CODE 250: Performed by: STUDENT IN AN ORGANIZED HEALTH CARE EDUCATION/TRAINING PROGRAM

## 2021-04-22 PROCEDURE — 85014 HEMATOCRIT: CPT

## 2021-04-22 RX ORDER — LEVETIRACETAM 1000 MG/1
1000 TABLET ORAL 2 TIMES DAILY
Qty: 60 TABLET | Refills: 0 | Status: SHIPPED | OUTPATIENT
Start: 2021-04-22 | End: 2023-01-03 | Stop reason: SDUPTHER

## 2021-04-22 RX ORDER — LEVETIRACETAM 1000 MG/1
1000 TABLET ORAL 2 TIMES DAILY
COMMUNITY
End: 2021-04-22

## 2021-04-22 RX ADMIN — DEXTROSE 2000 MG: 50 INJECTION, SOLUTION INTRAVENOUS at 06:04

## 2021-07-20 ENCOUNTER — LAB VISIT (OUTPATIENT)
Dept: PRIMARY CARE CLINIC | Facility: OTHER | Age: 36
End: 2021-07-20
Payer: OTHER GOVERNMENT

## 2021-07-20 DIAGNOSIS — Z20.822 ENCOUNTER FOR LABORATORY TESTING FOR COVID-19 VIRUS: ICD-10-CM

## 2021-07-20 PROCEDURE — U0003 INFECTIOUS AGENT DETECTION BY NUCLEIC ACID (DNA OR RNA); SEVERE ACUTE RESPIRATORY SYNDROME CORONAVIRUS 2 (SARS-COV-2) (CORONAVIRUS DISEASE [COVID-19]), AMPLIFIED PROBE TECHNIQUE, MAKING USE OF HIGH THROUGHPUT TECHNOLOGIES AS DESCRIBED BY CMS-2020-01-R: HCPCS | Performed by: INTERNAL MEDICINE

## 2021-07-22 LAB
SARS-COV-2 RNA RESP QL NAA+PROBE: NOT DETECTED
SARS-COV-2- CYCLE NUMBER: -1

## 2021-08-17 ENCOUNTER — LAB VISIT (OUTPATIENT)
Dept: PRIMARY CARE CLINIC | Facility: OTHER | Age: 36
End: 2021-08-17
Attending: INTERNAL MEDICINE
Payer: MEDICAID

## 2021-08-17 DIAGNOSIS — Z20.822 ENCOUNTER FOR LABORATORY TESTING FOR COVID-19 VIRUS: ICD-10-CM

## 2021-08-17 PROCEDURE — U0003 INFECTIOUS AGENT DETECTION BY NUCLEIC ACID (DNA OR RNA); SEVERE ACUTE RESPIRATORY SYNDROME CORONAVIRUS 2 (SARS-COV-2) (CORONAVIRUS DISEASE [COVID-19]), AMPLIFIED PROBE TECHNIQUE, MAKING USE OF HIGH THROUGHPUT TECHNOLOGIES AS DESCRIBED BY CMS-2020-01-R: HCPCS | Performed by: INTERNAL MEDICINE

## 2021-08-18 LAB
SARS-COV-2 RNA RESP QL NAA+PROBE: NOT DETECTED
SARS-COV-2- CYCLE NUMBER: -1

## 2021-10-05 ENCOUNTER — LAB VISIT (OUTPATIENT)
Dept: PRIMARY CARE CLINIC | Facility: OTHER | Age: 36
End: 2021-10-05
Payer: MEDICAID

## 2021-10-05 DIAGNOSIS — Z20.822 ENCOUNTER FOR LABORATORY TESTING FOR COVID-19 VIRUS: ICD-10-CM

## 2021-10-05 PROCEDURE — U0003 INFECTIOUS AGENT DETECTION BY NUCLEIC ACID (DNA OR RNA); SEVERE ACUTE RESPIRATORY SYNDROME CORONAVIRUS 2 (SARS-COV-2) (CORONAVIRUS DISEASE [COVID-19]), AMPLIFIED PROBE TECHNIQUE, MAKING USE OF HIGH THROUGHPUT TECHNOLOGIES AS DESCRIBED BY CMS-2020-01-R: HCPCS

## 2021-10-06 LAB
SARS-COV-2 RNA RESP QL NAA+PROBE: NOT DETECTED
SARS-COV-2- CYCLE NUMBER: NORMAL

## 2021-11-16 ENCOUNTER — LAB VISIT (OUTPATIENT)
Dept: PRIMARY CARE CLINIC | Facility: OTHER | Age: 36
End: 2021-11-16
Payer: OTHER GOVERNMENT

## 2021-11-16 DIAGNOSIS — Z20.822 ENCOUNTER FOR LABORATORY TESTING FOR COVID-19 VIRUS: ICD-10-CM

## 2021-11-16 PROCEDURE — U0003 INFECTIOUS AGENT DETECTION BY NUCLEIC ACID (DNA OR RNA); SEVERE ACUTE RESPIRATORY SYNDROME CORONAVIRUS 2 (SARS-COV-2) (CORONAVIRUS DISEASE [COVID-19]), AMPLIFIED PROBE TECHNIQUE, MAKING USE OF HIGH THROUGHPUT TECHNOLOGIES AS DESCRIBED BY CMS-2020-01-R: HCPCS

## 2021-11-17 LAB
SARS-COV-2 RNA RESP QL NAA+PROBE: NOT DETECTED
SARS-COV-2- CYCLE NUMBER: NORMAL

## 2021-12-21 ENCOUNTER — LAB VISIT (OUTPATIENT)
Dept: PRIMARY CARE CLINIC | Facility: OTHER | Age: 36
End: 2021-12-21
Payer: OTHER GOVERNMENT

## 2021-12-21 DIAGNOSIS — Z20.822 ENCOUNTER FOR LABORATORY TESTING FOR COVID-19 VIRUS: ICD-10-CM

## 2021-12-21 PROCEDURE — U0003 INFECTIOUS AGENT DETECTION BY NUCLEIC ACID (DNA OR RNA); SEVERE ACUTE RESPIRATORY SYNDROME CORONAVIRUS 2 (SARS-COV-2) (CORONAVIRUS DISEASE [COVID-19]), AMPLIFIED PROBE TECHNIQUE, MAKING USE OF HIGH THROUGHPUT TECHNOLOGIES AS DESCRIBED BY CMS-2020-01-R: HCPCS | Performed by: INTERNAL MEDICINE

## 2021-12-22 LAB
SARS-COV-2 RNA RESP QL NAA+PROBE: NOT DETECTED
SARS-COV-2- CYCLE NUMBER: NORMAL

## 2022-02-08 ENCOUNTER — LAB VISIT (OUTPATIENT)
Dept: PRIMARY CARE CLINIC | Facility: OTHER | Age: 37
End: 2022-02-08
Payer: MEDICAID

## 2022-02-08 DIAGNOSIS — Z20.822 ENCOUNTER FOR LABORATORY TESTING FOR COVID-19 VIRUS: ICD-10-CM

## 2022-02-08 LAB
SARS-COV-2 RNA RESP QL NAA+PROBE: NOT DETECTED
SARS-COV-2- CYCLE NUMBER: NORMAL

## 2022-02-08 PROCEDURE — U0003 INFECTIOUS AGENT DETECTION BY NUCLEIC ACID (DNA OR RNA); SEVERE ACUTE RESPIRATORY SYNDROME CORONAVIRUS 2 (SARS-COV-2) (CORONAVIRUS DISEASE [COVID-19]), AMPLIFIED PROBE TECHNIQUE, MAKING USE OF HIGH THROUGHPUT TECHNOLOGIES AS DESCRIBED BY CMS-2020-01-R: HCPCS | Performed by: INTERNAL MEDICINE

## 2022-04-30 NOTE — DISCHARGE SUMMARY
Patient:   Joe Nunn            MRN: [id number]            FIN: [id number]               Age:   33 years     Sex:  Male     :  1985   Associated Diagnoses:   History of seizures; Seizure-like activity   Author:   Chandu Multani MD      Discharge Information   Discharge Summary Information:  Admitted  2018, Discharged  2018.         Discharge diagnosis: History of seizures (JPL67-JA Z87.898), Seizure-like activity (LBM64-GE R56.9).         Discharge medications: OTHER MEDICATIONS (Selected)   Prescriptions  Prescribed  Dilantin 100 mg oral capsule, extended release: 200 mg = 2 cap(s), Oral, BID, # 120 cap(s), 1 Refill(s)  Keppra 1000 mg oral tablet: 1,000 mg = 1 tab(s), Oral, BID, # 60 tab(s), 1 Refill(s)  Orders: Orders, See Instructions, Obtain phenytoin and levetiracetam levels in 1-2 weeks (prior to neurology appt), -, # 1 units, 0 Refill(s)  Orders: Orders, See Instructions, Recommend neurology f/u in 1-2 weeks after obtaining Keppra, Dilantin levels, # 1 units, 0 Refill(s)  gabapentin 300 mg oral capsule: 600 mg = 2 cap(s), Oral, BID, # 120 cap(s), 1 Refill(s).           Hospital Course   33-year-old male prisoner with past medical history of HTN, bipolar type I, multiple ED visits in the past for seizure-like activity and suicidal ideations presented to ED after having 3 witnessed seizure-like activity at 9:14, 9:26, and 9:36 PM while in snf. While in the ED patient also had 2 episodes of seizure-like activity (12 AM on the way to CT, 1:15 AM witnessed by nursing staff) for which he was given 2 mg of Ativan.  Nursing staff reported that patient's seizure like activity lasted less than 30 seconds, eyes were open, and tonic-clonic activity of upper and lower extremities.  Patient denied post ictal state, confusion, tongue biting, incontinence, myalgia, or headache after all of his seizure-like activities. Patient reported that he was currently on Keppra 1000 mg  twice a day and phenytoin 100 ER twice a day for which he is compliant with his medications. However. per guard patient has a history or hoarding his medications.    ED course: Patient was given 2 mg of Ativan after his 2nd seizure-like activity with rapid alleviation of symptoms.  Preliminary readings of head CT was unremarkable.  Laboratory investigations were unremarkable.  Medicine was consulted for evaluation of seizure-like activity.  Patient was initially discharged due to inadequate support for seizure activity based on physical exam and witnessing of episodes.  Patient was instructed to follow-up with neurology. Upon discharge on 1/21/2018 from the emergency room department at [facility] patient immediately returned with questionable seizure-like activity agian. Patient denied post ictal state, confusion, tongue biting, incontinence, myalgia, or headache.  He denied head trauma.   at bedside denied head trauma. Patient was transferred to 6th floor for further monitoring symptoms and EEG on 1/22/2018    On HD1 patient had had no further seizure activity for 24 hours. He was tolerating po and had no complaints other than mild HA. During EEG 1/22 am patient had one additional spell which was observed by nursing staff and not found to be consistent with seizure (patient had no tongue biting, no micturition, no postictal state, observed volitional movements during spell). Patient's Dilantin level was found to be subtherapeutic at 8.7. Based on our assessment and review of patient's prior medical records, behavior appears more consistent with malingering. We recommend increasing patient's Dilantin to 200 mg BID with direct observed therapy to ensure compliance and repeating antiepileptic serum levels in 1-2 weeks with close neuro follow up. He will be discharged to skilled nursing.    Consults: none  Procedures: none      Physical Examination   Vital Signs   1/21/2018 7:00 CST       Temperature Oral          36.8  DegC                             Temperature Oral (calculated)             98.24 DegF                             Heart Rate Monitored      84 bpm                             Respiratory Rate          18 br/min                             SpO2                      96 %                             Systolic Blood Pressure   113 mmHg                             Diastolic Blood Pressure  64 mmHg                             Mean Arterial Pressure, Cuff              80 mmHg     General: no apparent distress, resting comfortably in be, patient's left ankle in restraint by custodial personnel  HEENT: Mild erythema and swelling of the posterior aspect of scalp, EOMI, no nasal discharge, moist mucous membranes, no erythema or exudate in the oropharynx  Neck: no lymphadenopathy, midline trachea  Respiratory: Equal chest expansion, CTA bilaterally, no wheezing, no crackles, no rhonchi  Cardiovascular: RRR, S1S2, no murmurs rubs or gallops, no JVD  Gastrointestinal: +BS, NT/ND, no guarding, no rigidity  Extremity: No erythema, no edema, no tenderness  Genitourinary: No suprapubic tenderness  Neurologic: Alert and oriented to time place and person  Psychiatric: Appropriate mood and affect, able to follow commands, responds appropriately.         Discharge Plan        Condition upon discharge: Stable   Discharge Location: Home   Activity: As tolerated    Diet: Regular   Date of next appointment: recommend f/u 1-2 weeks with neurology    Issues to be addressed at follow-up: compliance with meds, f/u repeat Dilantin, Keppra levels, f/u EEG results       Discharge Summary Plan   Discharge Status: stable.     Discharge instructions given: to patient, .     Discharge disposition: pen[facility]iary.

## 2022-04-30 NOTE — ED PROVIDER NOTES
Patient:   Joe Nunn            MRN: [id number]            FIN: [id number]               Age:   35 years     Sex:  Male     :  1985   Associated Diagnoses:   None   Author:   Brian Pack MD      Addendum      Teaching-Supervisory Addendum-Brief   I participated in the following activities of this patients care: the medical history, the physical exam, medical decision making.   I personally performed: supervision of the patient's care, the medical history, the physical exam, the medical decision making.   The case was discussed with: midlevel provider.   Results interpretation: I agree with the study interpretation in this patient's care.   Notes: Dr. Pack dictating I have reviewed the mid-level note and agree with the findings. I performed an independent history and physical examination the patient had face-to-face time with the patient.    34 yo m, inmate, psych hx, frequent FB ingestions, recently had surgery for perf bowel, fb, presents with fever, fluid collection on CT, fever here, abd benign, neg covid screen last week.  Will admit, IV abx given in ER.  SH saw pt, will admit..

## 2022-04-30 NOTE — ED PROVIDER NOTES
Patient:   Joe Nunn            MRN: [id number]            FIN: [id number]               Age:   35 years     Sex:  Male     :  1985   Associated Diagnoses:   Ingestion of foreign body   Author:   Naveed Story MD      Basic Information   Time seen: Immediately upon arrival.   History source: Patient.   Arrival mode: Police.   History limitation: Cognitive impairment.   Additional information: Chief Complaint from Nursing Triage Note : Chief Complaint   2020 14:26 CDT      Chief Complaint           PRISONER W RECENT ABD SURG FOR FB REMOVAL ON 20.  REPORTS VOMITING BRITE RED BLD. X 2 TODAY AFTER PLAYING BASKET BALL.  PT CO ABD PAIN.  .   Provider/Visit info:   Time Seen:  Naveed Story MD / 2020 14:53  .   History of Present Illness   The patient presents with abdominal pain.  The onset was just prior to arrival.  The course/duration of symptoms is constant and worsening.  The character of symptoms is sharp.  The degree at onset was severe.  The Location of pain at onset was mid epigastric.  The degree at present is severe.  The Location of pain at present is mid epigastric.  Radiating pain: none. The exacerbating factor is movement.  The relieving factor is none.  Therapy today: none.  Risk factors consist of recent surgery for FB ingestion of wire, which punctured duodenum and bowel..  Associated symptoms: nausea, vomiting and hematemesis.  Additional history: none.        Review of Systems   Constitutional symptoms:  No fever, no chills, no sweats.    Skin symptoms:  No rash,    Eye symptoms:  No recent vision problems,    ENMT symptoms:  No sore throat,    Respiratory symptoms:  No shortness of breath, no cough.    Cardiovascular symptoms:  No chest pain, no tachycardia.    Gastrointestinal symptoms:  Negative except as documented in HPI.   Genitourinary symptoms:  No dysuria,    Musculoskeletal symptoms:  No back pain, no Muscle pain.    Neurologic symptoms:  No headache,  no dizziness.              Additional review of systems information: All other systems reviewed and otherwise negative.      Health Status   Allergies:    Allergic Reactions (Selected)  Severity Not Documented  Reglan- Rash.  Sulfa drugs- Hives.  Zofran- Rash and hives.,    Allergies (3) Active Reaction  Reglan Rash  sulfa drugs Hives  Zofran rash and hives  .   Medications:  (Selected)   Inpatient Medications  Ordered  IVF Lactated Ringers LR Infusion 1,000 mL: 1,000 mL, 1,000 mL, IV, 125 mL/hr, start date 04/27/20 18:17:00 CDT, 2.06, m2  Lovenox: 40 mg, form: Injection, Subcutaneous, Daily, first dose 04/27/20 18:17:00 CDT, STAT  lidocaine 1% injectable solution: 10 mL, form: Injection, Subcutaneous, Once, first dose 04/10/18 1:30:00 CDT, stop date 04/10/18 1:30:00 CDT  scopolamine 1.5 mg transdermal film, extended release: 1.5 mg, form: Patch-72, TD, q24hr PRN for nausea/vomiting, first dose 04/27/20 18:15:00 CDT, STAT, 2 patches for 3 mg dose  Pending Complete  potassium chloride 10 mEq/100 mL intravenous solution: 10 mEq, form: Soln, IV Piggyback, q1hr, Infuse over: 1 hr, Order duration: 4 dose(s), first dose 04/09/18 17:00:00 CDT, stop date 04/09/18 20:59:00 CDT, 40 mEq ( x4 10meq, run each over 1 hour)  Prescriptions  Prescribed  Dilantin 100 mg oral capsule, extended release: 200 mg = 2 cap(s), Oral, BID, # 120 cap(s), 0 Refill(s)  Zoloft 50 mg oral tablet: 50 mg = 1 tab(s), Oral, Daily, # 30 tab(s), 0 Refill(s).      Past Medical/ Family/ Social History   Medical history:    Active  Bipolar 1 disorder (296.7)  Seizure disorder (345.90).   Surgical history:    Exploration Laparotomy (None) on 4/13/2020 at 35 Years.  Comments:  4/13/2020 10:13 CDT - Valery Tirado  auto-populated from documented surgical case  Central Line Insertion on 4/13/2020 at 35 Years.  Comments:  4/13/2020 10:13 DIANAT - Valery Tirado  auto-populated from documented surgical case.   Family history:    No family  history items have been selected or recorded..   Social history:    Social & Psychosocial Habits    Alcohol  06/12/2015 Risk Assessment: High Risk    11/12/2017  Use: Current    Type: Beer    Frequency: 1-2 times per week    04/12/2020  Use: Past    Type: Beer    Frequency: 1-2 times per week    Substance Use  06/12/2015 Risk Assessment: Denies Substance Abuse    11/12/2017  Use: Never    04/09/2020  Use: Past    Tobacco  06/01/2015 Risk Assessment: High Risk    03/08/2020  Use: 10 or more cigarettes (1/    Type: Cigarettes    Patient Wants Consult For Cessation Counseling No    04/09/2020  Use: Former smoker, quit more    Patient Wants Consult For Cessation Counseling No    04/12/2020  Use: 10 or more cigarettes (1/    Patient Wants Consult For Cessation Counseling No    04/19/2020  Use: Former smoker, quit more    Patient Wants Consult For Cessation Counseling No    04/27/2020  Use: 10 or more cigarettes (1/    Patient Wants Consult For Cessation Counseling No    Abuse/Neglect  03/08/2020  SHX Any signs of abuse or neglect No    04/09/2020  SHX Any signs of abuse or neglect No    04/12/2020  SHX Any signs of abuse or neglect No    Feels unsafe at home: No    Safe place to go: Yes    04/27/2020  SHX Any signs of abuse or neglect No  .   Problem list:    Active Problems (7)  Bipolar 1 disorder   Impaired mobility   Knowledge deficit   Malingering   Obesity   Seizure disorder   Tobacco user   .      Physical Examination               Vital Signs      Vital Signs (last 24 hrs)_____  Last Charted___________  Temp Oral     37.1 DegC  (APR 27 14:26)  Heart Rate Peripheral   94 bpm  (APR 27 16:38)  Resp Rate         20 br/min  (APR 27 16:38)  SBP      124 mmHg  (APR 27 16:38)  DBP      65 mmHg  (APR 27 16:38)  SpO2      98 %  (APR 27 16:38)  Weight      98 kg  (APR 27 14:26)  Height      167 cm  (APR 27 14:26)  BMI      35.14  (APR 27 14:26)  .   General:  Alert, no acute distress.    Skin:  Warm, dry.    Head:   Normocephalic.   Neck:  Supple, trachea midline, no tenderness, no JVD.    Eye:  Pupils are equal, round and reactive to light, extraocular movements are intact.    Ears, nose, mouth and throat:  Oral mucosa moist, no pharyngeal erythema or exudate.    Cardiovascular:  Regular rate and rhythm, No murmur, Normal peripheral perfusion, No edema.    Respiratory:  Lungs are clear to auscultation, respirations are non-labored, breath sounds are equal, Symmetrical chest wall expansion.    Chest wall:  No tenderness.   Back:  Normal alignment.   Musculoskeletal:  No swelling, no deformity.    Gastrointestinal:  Soft, Non distended, Tenderness: Moderate, epigastric, left upper quadrant, Guarding: Negative, Rebound: Negative, Bowel sounds: Quiet.    Neurological:  Alert and oriented to person, place, time, and situation, normal motor observed, normal speech observed.    Psychiatric:  Cooperative.      Medical Decision Making   Documents reviewed:  Emergency department nurses' notes.   Results review:  Lab results : Lab View   4/27/2020 14:46 CDT      Sodium Lvl                143 mmol/L                             Potassium Lvl             3.7 mmol/L                             Chloride                  110 mmol/L  HI                             CO2                       27 mmol/L                             Calcium Lvl               9.1 mg/dL                             Glucose Lvl               104 mg/dL                             BUN                       22 mg/dL  HI                             Creatinine                1.20 mg/dL                             eGFR-AA                   88 mL/min  LOW                             eGFR-JUAN                  72 mL/min  LOW                             Bili Total                0.2 mg/dL                             Bili Direct               <0.1 mg/dL                             Bili Indirect             Calc. invalid mg/dL                             AST                       24  unit/L                             ALT                       40 unit/L                             Alk Phos                  116 unit/L                             Total Protein             7.8 gm/dL                             Albumin Lvl               3.7 gm/dL                             Globulin                  4.10 gm/mL  HI                             A/G Ratio                 0.9 ratio  LOW                             PT                        12.5 second(s)                             INR                       0.94                             PTT                       30.4 second(s)                             WBC                       11.1 x10(3)/mcL  HI                             RBC                       4.60 x10(6)/mcL                             Hgb                       14.2 gm/dL                             Hct                       41.8 %                             Platelet                  382 x10(3)/mcL                             MCV                       90.9 fL                             MCH                       30.9 pg                             MCHC                      34.0 gm/dL                             RDW                       13.3 %                             MPV                       9.1 fL                             Abs Neut                  7.95 x10(3)/mcL                             Neutro Auto               72 %  NA                             Lymph Auto                16 %  NA                             Mono Auto                 9 %  NA                             Eos Auto                  2 %  NA                             Abs Eos                   0.3 x10(3)/mcL                             Basophil Auto             1 %  NA                             Abs Neutro                7.95 x10(3)/mcL                             Abs Lymph                 1.8 x10(3)/mcL                             Abs Mono                  1.0 x10(3)/mcL                             Abs Baso                   0.1 x10(3)/mcL                             IG%                       0 %  NA                             IG#                       0.0400  NA                             ABO/Rh                    O POS  .   Radiology results:  Rad Results (ST)  < 12 hrs   Accession: [id number]  Order: CT Abdomen and Pelvis W Contrast  Report Dt/Tm: 04/27/2020 17:06  Report:   Clinical History:  Abdominal Pain     Technique:  Multidetector IV contrast enhanced axial CT images of the abdomen and  pelvis were obtained with coronal and sagittal reconstructions.      Automatic exposure control was utilized to reduce the patient's  radiation dose.     Comparison:  4/11/2020     Findings:     01. HEPATOBILIARY: No focal hepatic lesion is identified, The  gallbladder is normal.      02. SPLEEN: Normal     03. PANCREAS: No focal masses or ductal dilatation.     04. ADRENALS: No adrenal nodules.     05. KIDNEYS: The right kidney demonstrates no stone, hydronephrosis,  or hydroureter. No focal mass identified.The left kidney demonstrates  no stone, hydronephrosis, or hydroureter. No focal mass identified.     06. LYMPHADENOPATHY/RETROPERITONEUM: There is no retroperitoneal  lymphadenopathy. The abdominal aorta is normal in course and caliber.      07. BOWEL: Two previously identified wires within the colon and  duodenum are no longer seen and likely surgically removed. There is a  new metallic wire identified within the fundus of the stomach. Likely  related to  Ingestion of new metallic foreign body.     08. PELVIC VISCERA: Normal. No pelvic mass.     09. PELVIC LYMPH NODES: No lymphadenopathy.     10. PERITONEUM/ABDOMINAL WALL: Edematous changes of the ventral  abdominal wall likely related to recent surgery. Within the omentum  just superior to the transverse colon (series 2 image 34 there are  some scattered fluid with no definable collection identified. Findings  likely related to recent surgery.     11. SKELETAL: No  aggressive appearing lytic/blastic lesion. No acute  fractures, subluxations or dislocations.     12. LUNG BASES: The visualized lungs are unremarkable.     Impression  Two previously identified wires within the colon and duodenum are no  longer seen and likely surgically removed. There is a new metallic  wire identified within the fundus of the stomach. Likely related to  Ingestion of new metallic foreign body.        Edematous changes of the ventral abdominal wall likely related to  recent surgery. Within the omentum just superior to the transverse  colon (series 2 image 34 there is some scattered fluid with no  definable collection identified. Findings likely related to recent  surgery.         .      Reexamination/ Reevaluation   Vital signs   results included from flowsheet : Vital Signs   4/27/2020 16:38 CDT      Peripheral Pulse Rate     94 bpm                             Respiratory Rate          20 br/min                             SpO2                      98 %                             Systolic Blood Pressure   124 mmHg                             Diastolic Blood Pressure  65 mmHg    4/27/2020 15:53 CDT      Peripheral Pulse Rate     99 bpm    4/27/2020 14:26 CDT      Temperature Oral          37.1 DegC                             Temperature Oral (calculated)             98.78 DegF                             Peripheral Pulse Rate     101 bpm  HI                             Respiratory Rate          18 br/min                             SpO2                      97 %                             Systolic Blood Pressure   123 mmHg                             Diastolic Blood Pressure  87 mmHg        Impression and Plan   Diagnosis   Ingestion of foreign body ([id number] T18.9XXA)      Calls-Consults   -  4/27/2020 17:15:00 , Leonidas HARO, Martin HERRON, surgery, consult.    Plan   Condition: Guarded.    Disposition: Admit time  4/27/2020 18:36:00, Admit to Surgery.

## 2022-04-30 NOTE — ED PROVIDER NOTES
Patient:   Joe Nunn            MRN: [id number]            FIN: [id number]               Age:   35 years     Sex:  Male     :  1985   Associated Diagnoses:   Hematemesis; Acute bilateral lower abdominal pain; Gastritis   Author:   Sushil Campa MD      Basic Information   Time seen: Date & time 2020 00:45:00.   History source: Patient.   History limitation: None.   Additional information: Chief Complaint from Nursing Triage Note : Chief Complaint   2020 0:27 CDT        Chief Complaint           Pt arrived via PD from [facility] for C/O bilateral lower abd pain since , vomited large amount of blood x 1.  .      History of Present Illness   The patient presents with 35 WM h/o bipolar disorder, seizure disorder, TBI, substance abuse, malingering, currently incarcerated who presents with bilateral lower abdominal pain and vomiting blood that was bright red x3 [id number] ago.  He has a history of foreign body ingestions where he suffered a perforated bowel that was repaired 2020 at [facility].  Ingested metallic wire.  He has been evaluated in our hospital a couple times since this incident including 2020 when he had an upper GI scope supposed had hematemesis.  He had a normal scope with no evidence of blood loss and no foreign body according to the report. He denies NSAID use.  We called the [facility] and he was witnessed to vomit blood they report only one episode    since surgery he has been seen in ED on , , , , now today   .  The onset was 1  hours ago.  The course/duration of symptoms is episodic.  The character of symptoms is crampy and dull.  The degree at onset was severe.  The degree at present is moderate.  The Location of pain at present is bilateral, lower and abdominal.  Radiating pain: none. Associated symptoms: nausea and vomiting.        Review of Systems   Constitutional symptoms:  Negative except as documented in HPI.   Skin symptoms:  Negative except  as documented in HPI.   Eye symptoms:  Negative except as documented in HPI.   ENMT symptoms:  Negative except as documented in HPI.   Respiratory symptoms:  Negative except as documented in HPI.   Cardiovascular symptoms:  Negative except as documented in HPI.   Gastrointestinal symptoms:  Negative except as documented in HPI.   Genitourinary symptoms:  Negative except as documented in HPI.   Musculoskeletal symptoms:  Negative except as documented in HPI.   Neurologic symptoms:  Negative except as documented in HPI.      Health Status   Allergies:    Allergic Reactions (Selected)  Severity Not Documented  Reglan- Rash.  Sulfa drugs- Hives.  Zofran- Rash and hives.,    Allergies (3) Active Reaction  Reglan Rash  sulfa drugs Hives  Zofran rash and hives  .   Medications:  (Selected)   Inpatient Medications  Ordered  lidocaine 1% injectable solution: 10 mL, form: Injection, Subcutaneous, Once, first dose 04/10/18 1:30:00 CDT, stop date 04/10/18 1:30:00 CDT  Pending Complete  potassium chloride 10 mEq/100 mL intravenous solution: 10 mEq, form: Soln, IV Piggyback, q1hr, Infuse over: 1 hr, Order duration: 4 dose(s), first dose 04/09/18 17:00:00 CDT, stop date 04/09/18 20:59:00 CDT, 40 mEq ( x4 10meq, run each over 1 hour)  Prescriptions  Prescribed  Augmentin 875 mg-125 mg oral tablet: = 1 tab(s), Oral, BID, # 14 tab(s), 0 Refill(s)  Dilantin 100 mg oral capsule, extended release: 200 mg = 2 cap(s), Oral, BID, # 120 cap(s), 0 Refill(s)  Zoloft 50 mg oral tablet: 50 mg = 1 tab(s), Oral, Daily, # 30 tab(s), 0 Refill(s)  polyethylene glycol 3350 oral powder for reconstitution: 17 gm, Oral, BID, # 527 gm, 0 Refill(s)  Documented Medications  Documented  Keppra 1000 mg oral tablet: 1,000 mg = 1 tab(s), Oral, Daily, 0 Refill(s).      Past Medical/ Family/ Social History   Medical history:    Active  Bipolar 1 disorder (296.7)  Seizure disorder (345.90).   Surgical history:    Esophagogastroduodenoscopy on 5/4/2020 at 35  Years.  Comments:  5/4/2020 10:28 DIANAT - Oliva Kirkpatrick  auto-populated from documented surgical case  Exploration Laparotomy (None) on 4/13/2020 at 35 Years.  Comments:  4/13/2020 10:13 Valery Zarate  auto-populated from documented surgical case  Central Line Insertion on 4/13/2020 at 35 Years.  Comments:  4/13/2020 10:13 Valery Zarate  auto-populated from documented surgical case.   Family history:    No family history items have been selected or recorded..   Social history:    Social & Psychosocial Habits    Alcohol  06/12/2015 Risk Assessment: High Risk    04/27/2020  Use: Past    Type: Beer    05/02/2020  Use: Past    Employment/School  04/27/2020  Status: Unemployed    Exercise    Comment: denies - 04/27/2020 23:26 - Anika Ford RN    Home/Environment  04/27/2020  Lives with: Alone    Comment: In [place] - 04/27/2020 23:27 - Anika Ford RN    Nutrition/Health  04/27/2020  Home Diet Regular    Appetite Poor    Sexual  04/27/2020  Sexually active: No    What is your current gender identity? (Check all that apply) Identifies as male    Substance Use  06/12/2015 Risk Assessment: Denies Substance Abuse    04/27/2020  Use: Past    Tobacco  06/01/2015 Risk Assessment: High Risk    04/27/2020  Use: Former smoker, quit more    Patient Wants Consult For Cessation Counseling N/A    05/04/2020  Use: Former smoker, quit more    Patient Wants Consult For Cessation Counseling No    05/06/2020  Use: Former smoker, quit more    Patient Wants Consult For Cessation Counseling N/A    Abuse/Neglect  05/02/2020  SHX Any signs of abuse or neglect No    05/04/2020  SHX Any signs of abuse or neglect No    05/06/2020  SHX Any signs of abuse or neglect No    Feels unsafe at home: No    Safe place to go: Yes  .   Problem list:    Active Problems (7)  Bipolar 1 disorder   Impaired mobility   Knowledge deficit   Malingering   Obesity   Seizure disorder   Tobacco user   .      Physical  Examination               Vital Signs   Vital Signs   5/6/2020 2:00 CDT        Heart Rate Monitored      73 bpm                             Respiratory Rate          18 br/min                             SpO2                      96 %                             Oxygen Therapy            Room air                             Systolic Blood Pressure   109 mmHg                             Diastolic Blood Pressure  77 mmHg                             Mean Arterial Pressure, Cuff              88 mmHg    5/6/2020 1:00 CDT        Oxygen Therapy            Room air    5/6/2020 0:33 CDT        Oxygen Therapy            Room air    5/6/2020 0:27 CDT        Temperature Oral          36.7 DegC                             Temperature Oral (calculated)             98.06 DegF                             Peripheral Pulse Rate     96 bpm                             Respiratory Rate          16 br/min                             SpO2                      100 %                             Oxygen Therapy            Room air                             Systolic Blood Pressure   158 mmHg  HI                             Diastolic Blood Pressure  109 mmHg  HI  .      Vital Signs (last 24 hrs)_____  Last Charted___________  Temp Oral     36.7 DegC  (MAY 06 00:27)  Heart Rate Peripheral   96 bpm  (MAY 06 00:27)  Resp Rate         18 br/min  (MAY 06 02:00)  SBP      109 mmHg  (MAY 06 02:00)  DBP      77 mmHg  (MAY 06 02:00)  SpO2      96 %  (MAY 06 02:00)  .   Oxygen saturation.   General:  Alert, no acute distress.    Skin:  Warm, dry.    Head:  Normocephalic.   Eye   Cardiovascular:  Regular rate and rhythm, Normal peripheral perfusion.    Respiratory:  Lungs are clear to auscultation, respirations are non-labored, breath sounds are equal, Symmetrical chest wall expansion.    Gastrointestinal:  Soft, Nontender, Non distended, Normal bowel sounds, No organomegaly, there is a well-healing incision consistent with an ex lap.  No surrounding  erythema or induration no palpable fluid collection.  There is no tenderness guarding or rebound on my exam, Guarding: Negative, Rebound: Negative.    Musculoskeletal:  No deformity.   Neurological:  No focal neurological deficit observed, normal speech observed.    Psychiatric:  Cooperative, appropriate mood & affect.       Medical Decision Making   Rationale:  He reports the pain is in his bilateral lower abdomen and around the incision site.  Known to have postoperative fluid collection.  h/h minimal drop from previous.  CT unchanged. abd nontender on exam with no change in the fluid collection on CT.  He is afebrile.  No leukocytosis.  Surgery has evaluated for this collection already and felt to be noninfectious.  Will get a repeat H&H.  He has been given Protonix in the ED.      Reexamination/ Reevaluation   Vital signs   results included from flowsheet : Vital Signs   5/6/2020 4:00 CDT        Peripheral Pulse Rate     79 bpm                             Respiratory Rate          18 br/min                             SpO2                      94 %                             Oxygen Therapy            Room air                             Systolic Blood Pressure   120 mmHg                             Diastolic Blood Pressure  83 mmHg                             Mean Arterial Pressure, Cuff              95 mmHg    5/6/2020 2:00 CDT        Heart Rate Monitored      73 bpm                             Respiratory Rate          18 br/min                             SpO2                      96 %                             Oxygen Therapy            Room air                             Systolic Blood Pressure   109 mmHg                             Diastolic Blood Pressure  77 mmHg                             Mean Arterial Pressure, Cuff              88 mmHg     Assessment: Due to slight drop in H&H and witnessed bleeding will be admitted for observation to rule out significant upper GI bleed.  I expect he will be able  to be discharged on PPI if his H&H does not continue to decline.      Impression and Plan   Diagnosis   Hematemesis ([id number] K92.0)   Gastritis (ZVP69-ON K29.70)   Acute bilateral lower abdominal pain (KEZ35-BH R10.31)   Plan   Condition: Improved, Stable.    Disposition: Admit.    Counseled: Patient, Regarding diagnosis, Regarding diagnostic results, Regarding treatment plan, Patient indicated understanding of instructions.    Notes

## 2022-04-30 NOTE — ED PROVIDER NOTES
"   Patient:   Joe Nunn            MRN: [id number]            FIN: [id number]               Age:   33 years     Sex:  Male     :  1985   Associated Diagnoses:   History of seizures; Generalized seizure   Author:   Crow Aponte MD      Basic Information   Time seen: Date 2018, Immediately upon arrival.   History source: Patient.   Arrival mode: Police.   History limitation: None.   Additional information: Chief Complaint from Nursing Triage Note : Chief Complaint   2018 4:14 CST       Chief Complaint           PT. brought back to ED after having "seizure" in transport vehicle. pt. denies any other complaints.  .      History of Present Illness   Presents with seizure.  The onset was just prior to arrival.  The occurrence was 1  episodes.  Witnessed: by police.  The location where the incident occurred was transport vehicle .  The character of symptoms is generalized.  The Postictal symptoms is none.  The exacerbating factor is none.  Risk factors consist of none.  Therapy today: none.  Associated symptoms: denies fever, denies chills, denies nausea, denies vomiting, denies headache, denies focal weakness, denies chest pain, denies shortness of breath, denies dizziness, denies swelling, denies confusion, denies altered sensation, denies cough, denies blood in stool, denies rash, denies abdominal pain, denies back pain, denies myalgia, denies fatigue, denies syncope and denies weight loss.  Associated injury: none.        Review of Systems   Constitutional symptoms:  No fever, no chills, no sweats, no weakness, no fatigue, no decreased activity.    Skin symptoms:  No rash, no pruritus, no abrasions, no breakdown, no dryness, no lesion.    Eye symptoms:  No recent vision problems, no pain, no diplopia, no blurred vision.    ENMT symptoms:  No ear pain, no sore throat, no nasal congestion.    Respiratory symptoms:  No shortness of breath, no cough, no hemoptysis, no sputum production.  "   Cardiovascular symptoms:  No chest pain, no palpitations, no tachycardia, no syncope, no diaphoresis, no peripheral edema.    Gastrointestinal symptoms:  No abdominal pain, no nausea, no vomiting, no diarrhea, no constipation, no rectal bleeding.    Genitourinary symptoms:  No dysuria, no hematuria.    Musculoskeletal symptoms:  No back pain, no Muscle pain, no Joint pain.    Neurologic symptoms:  No headache, no dizziness, no altered level of consciousness, no numbness, no tingling, no weakness.              Additional review of systems information: All systems reviewed as documented in chart.      Health Status   Allergies:    Allergic Reactions (Selected)  Severity Not Documented  Sulfa drugs- No reactions were documented.  Zofran- Rash and hives.,    Allergies (2) Active Reaction  sulfa drugs None Documented  Zofran rash and hives  .   Medications:  (Selected)   Prescriptions  Prescribed  Keppra 1000 mg oral tablet: 1,000 mg = 1 tab(s), Oral, BID, # 60 tab(s), 0 Refill(s)  phenytoin 100 mg oral capsule, extended release: 100 mg = 1 cap(s), Oral, BID, # 60 cap(s), 0 Refill(s)  Documented Medications  Documented  gabapentin 600 mg oral tablet: 600 mg, Oral, BID, 0 Refill(s)  hydrochlorothiazide-lisinopril 25 mg-20 mg oral tablet: 1 tab(s), Oral, Daily, # 30 tab(s), 0 Refill(s).      Past Medical/ Family/ Social History   Medical history:    Active  Bipolar 1 disorder (296.7)  Seizure disorder (345.90), Reviewed as documented in chart.   Surgical history:    No active procedure history items have been selected or recorded., Reviewed as documented in chart.   Family history:    No family history items have been selected or recorded., Reviewed as documented in chart.   Social history:    Social & Psychosocial Habits    Alcohol  06/12/2015 Risk Assessment: High Risk    11/12/2017  Use: Current    Type: Beer    Frequency: 1-2 times per week    Substance Abuse  06/12/2015 Risk Assessment: Denies Substance  Abuse    11/12/2017  Use: Never    Tobacco  06/01/2015 Risk Assessment: High Risk    11/12/2017  Use: Current every day smoker    Type: Cigarettes    Tobacco use per day: 6  , Reviewed as documented in chart.   Problem list:    Active Problems (5)  Bipolar 1 disorder   Knowledge deficit   Malingering   Seizure disorder   Tobacco user   .      Physical Examination               Vital Signs   Vital Signs   1/21/2018 4:14 CST       Temperature Oral          36.8 DegC                             Temperature Oral (calculated)             98.24 DegF                             Peripheral Pulse Rate     98 bpm                             Respiratory Rate          18 br/min                             SpO2                      98 %                             Systolic Blood Pressure   124 mmHg                             Diastolic Blood Pressure  87 mmHg  .   General:  Alert, no acute distress.    Kala coma scale:  Eye response: 4 /4, verbal response: 5 /5, motor response: 6 /6, Total score: Total score: 15.    Neurological:  Alert and oriented to person, place, time, and situation, No focal neurological deficit observed, normal sensory observed, normal motor observed, normal speech observed, normal coordination observed.    Skin:  Warm, pink, intact.    Head:  Normocephalic.   Neck:  Supple, trachea midline.    Eye:  Pupils are equal, round and reactive to light, extraocular movements are intact, normal conjunctiva.    Ears, nose, mouth and throat:  Oral mucosa moist.   Cardiovascular:  Regular rate and rhythm, Normal peripheral perfusion.    Respiratory:  Lungs are clear to auscultation, respirations are non-labored, breath sounds are equal.    Gastrointestinal:  Soft, Nontender, Non distended, Normal bowel sounds.    Psychiatric:  Cooperative.      Medical Decision Making   Documents reviewed:  Emergency department nurses' notes, emergency department records.       Reexamination/ Reevaluation   Time: 1/21/2018  04:18:00 .   Vital signs   results included from flowsheet : Vital Signs   1/21/2018 4:14 CST       Temperature Oral          36.8 DegC                             Temperature Oral (calculated)             98.24 DegF                             Peripheral Pulse Rate     98 bpm                             Respiratory Rate          18 br/min                             SpO2                      98 %                             Systolic Blood Pressure   124 mmHg                             Diastolic Blood Pressure  87 mmHg     Course: unchanged.   Assessment: exam unchanged, Pt with history of seizure disorder, per police-pt had a seizure in transport vehicle and pt was brought back to ED for further evaluation.  Pt currently awake, alert, oriented to name, month, year, place, situation. Will consult medicine for admission.  .      Impression and Plan   Diagnosis   History of seizures ([id number] Z87.898)   Generalized seizure (TVA13-EK R56.9)      Calls-Consults   -  1/21/2018 04:19:00 , Internal Medicine, recommends Spoke with Dr. Rios, will come and admt patient to tele..    Plan   Condition: Stable.    Disposition: Admit time  1/21/2018 04:20:00, Place in Observation Telemetry Unit.    Counseled: Patient, Regarding diagnosis, Regarding diagnostic results, Regarding treatment plan, Patient indicated understanding of instructions.

## 2022-04-30 NOTE — ED PROVIDER NOTES
"   Patient:   Joe Nunn            MRN: [id number]            FIN: [id number]               Age:   35 years     Sex:  Male     :  1985   Associated Diagnoses:   Intra-abdominal abscess   Author:   Austin Singletary      Basic Information   Time seen: Date & time 2020 20:15:00.   History source: Patient.   Arrival mode: Walking, police.   Additional information: Patient's physician(s): : Assumed care PAUL MORAN.      History of Present Illness   The patient presents with 34 yo male sent from group home presents for cough and fever starting today. Reports fever of 104 prior to arrival. States "coughing up blood." Denies any CP or SOB. JAYDE Jacob.  The onset was today.  The course/duration of symptoms is fluctuating in intensity.  Character productive: blood streaked.  The degree at onset was moderate.  The degree at present is none.  The exacerbating factor is none.  The relieving factor is none.  Risk factors consist of none.  Prior episodes: none.  Therapy today: see nurses notes.  Associated symptoms: abdominal pain .        Review of Systems   Constitutional symptoms:  No fever, no chills.    Respiratory symptoms:  Cough, hemoptysis, No shortness of breath,    Cardiovascular symptoms:  No chest pain, no palpitations.    Gastrointestinal symptoms:  Abdominal pain, no vomiting, no diarrhea.    Musculoskeletal symptoms:  No back pain,    Neurologic symptoms:  No altered level of consciousness, no weakness.              Additional review of systems information: All other systems reviewed and otherwise negative.      Health Status   Allergies:    Allergic Reactions (Selected)  Severity Not Documented  Reglan- Rash.  Sulfa drugs- Hives.  Zofran- Rash and hives.,    Allergies (3) Active Reaction  Reglan Rash  sulfa drugs Hives  Zofran rash and hives  .   Medications:  (Selected)   Inpatient Medications  Ordered  lidocaine 1% injectable solution: 10 mL, form: Injection, Subcutaneous, " Once, first dose 04/10/18 1:30:00 CDT, stop date 04/10/18 1:30:00 CDT  Pending Complete  potassium chloride 10 mEq/100 mL intravenous solution: 10 mEq, form: Soln, IV Piggyback, q1hr, Infuse over: 1 hr, Order duration: 4 dose(s), first dose 04/09/18 17:00:00 CDT, stop date 04/09/18 20:59:00 CDT, 40 mEq ( x4 10meq, run each over 1 hour)  Prescriptions  Prescribed  Dilantin 100 mg oral capsule, extended release: 200 mg = 2 cap(s), Oral, BID, # 120 cap(s), 0 Refill(s)  Zoloft 50 mg oral tablet: 50 mg = 1 tab(s), Oral, Daily, # 30 tab(s), 0 Refill(s), per nurse's notes.   Immunizations: Per nurse's notes.      Past Medical/ Family/ Social History   Medical history:    Active  Bipolar 1 disorder (296.7)  Seizure disorder (345.90), Reviewed as documented in chart.   Surgical history:    Exploration Laparotomy (None) on 4/13/2020 at 35 Years.  Comments:  4/13/2020 10:13 DIANAT - Valery Tirado  auto-populated from documented surgical case  Central Line Insertion on 4/13/2020 at 35 Years.  Comments:  4/13/2020 10:13 ROVERTO - Valery Tirado  auto-populated from documented surgical case, Reviewed as documented in chart.   Family history:    No family history items have been selected or recorded., Reviewed as documented in chart.   Social history:    Social & Psychosocial Habits    Alcohol  06/12/2015 Risk Assessment: High Risk    04/27/2020  Use: Past    Type: Beer    Employment/School  04/27/2020  Status: Unemployed    Exercise    Comment: denies - 04/27/2020 23:26 - Anika Ford RN    Home/Environment  04/27/2020  Lives with: Alone    Comment: In [place] - 04/27/2020 23:27 - Anika Ford RN    Nutrition/Health  04/27/2020  Home Diet Regular    Appetite Poor    Sexual  04/27/2020  Sexually active: No    What is your current gender identity? (Check all that apply) Identifies as male    Substance Use  06/12/2015 Risk Assessment: Denies Substance Abuse    04/27/2020  Use:  Past    Tobacco  06/01/2015 Risk Assessment: High Risk    04/27/2020  Use: Former smoker, quit more    Patient Wants Consult For Cessation Counseling N/A    Abuse/Neglect  04/27/2020  SHX Any signs of abuse or neglect No    Feels unsafe at home: No    Safe place to go: Yes  , Reviewed as documented in chart.   Problem list:    Active Problems (7)  Bipolar 1 disorder   Impaired mobility   Knowledge deficit   Malingering   Obesity   Seizure disorder   Tobacco user   , per nurse's notes.      Physical Examination   General:  Alert, no acute distress, well hydrated, Skin: Normal for ethnicity.    Skin:  Warm, dry, pink, abdominal incision healed except for dehisced area with drainage adjacent to umbilicus , Not intact,    Head:  Normocephalic.   Neck:  Supple, no tenderness, full range of motion.    Eye:  Pupils are equal, round and reactive to light, extraocular movements are intact, normal conjunctiva.    Ears, nose, mouth and throat:  Oral mucosa moist.   Cardiovascular:  Regular rate and rhythm, No murmur, Normal peripheral perfusion.    Respiratory:  Lungs are clear to auscultation, breath sounds are equal, Respirations: not tachypneic, not labored, not shallow, Retractions: None.    Chest wall:  No tenderness.   Back:  Normal range of motion, Normal alignment, No costovertebral angle tenderness,    Musculoskeletal:  Normal ROM, normal strength, no swelling, no deformity.    Gastrointestinal:  Soft, Nontender, Non distended, Normal bowel sounds.    Neurological:  Alert and oriented to person, place, time, and situation, No focal neurological deficit observed, normal sensory observed, normal motor observed, normal speech observed, normal coordination observed, Gait: Normal.    Psychiatric:  Cooperative, appropriate mood & affect, normal judgment.       Medical Decision Making   Documents reviewed:  Emergency department nurses' notes.   Orders  Launch Orders   Laboratory:  CMP (Order): Stat collect, 5/1/2020 20:18  CDT, Blood, Lab Collect, Print Label By Order Location, 5/1/2020 20:18 CDT  CBC w/ Auto Diff (Order): Now collect, 5/1/2020 20:18 CDT, Blood, Lab Collect, Print Label By Order Location, 5/1/2020 20:18 CDT  Pharmacy:  acetaminophen (Order): 1,000 mg, form: Tab, Oral, Once, first dose 5/1/2020 20:18 CDT, stop date 5/1/2020 20:18 CDT, STAT  Radiology:  XR Chest 1 View (Order): Stat, 5/1/2020 20:18 CDT, Chest Pain, None, Ambulatory, Rad Type, Not Scheduled, Launch Orders   Laboratory:  Mononucleosis Screen (Order): Stat collect, 5/1/2020 22:36 CDT, Blood, Lab Collect, Print Label By Order Location, 5/1/2020 22:36 CDT  Radiology:  CT Abdomen and Pelvis W Contrast (Order): Stat, 5/1/2020 22:37 CDT, Abdominal Pain, None, Ambulatory, Patient Has IV?, Rad Type, Schedule this test, Launch Orders   Pharmacy:  Vancocin 1.5 gm/ mL (Order): 1,500 mg, form: Injection, IV Piggyback, Once, first dose 5/2/2020 0:14 CDT, stop date 5/2/2020 0:14 CDT, STAT  Zosyn 4.5 gm (for IVPB) (Order): 4.5 gm, form: Infusion, IV Piggyback, Once, Infuse over: 30 minute(s), first dose 5/2/2020 0:14 CDT, stop date 5/2/2020 0:14 CDT, STAT, Launch Orders   Laboratory:  Culture Blood (Order): 5/2/2020 0:33 CDT, Stat collect, Blood, Print Label By Order Location  Culture Blood (Order): 5/2/2020 0:33 CDT, Stat collect, Blood, Print Label By Order Location.    Chest X-Ray:  Time reported 5/1/2020 20:56:00, no acute disease process, interpretation by Emergency Physician.    Radiology results:  Reported at  5/2/2020 00:13:00, Computed tomography, abd/pelvis , interpretation:  4.5cm x 2 cm fluid collection with moderate fat dstranding of the mentum.       Impression and Plan   Diagnosis   Intra-abdominal abscess ([id number] K65.1)      Calls-Consults   -  5/2/2020 00:10:00 , Lex NORIEGA MD, Raymundo MUNOZ, recommends Dr Mcfadden will come see patient .    Plan   Condition: Stable.    Disposition: Admit time  5/2/2020 00:52:00, Place in Observation Unit.    Counseled:  Patient, Regarding diagnosis, Regarding diagnostic results, Regarding treatment plan, Patient indicated understanding of instructions.    Orders: Launch Orders   Admit/Transfer/Discharge:  Place in Outpatient Observation (Order): 5/2/2020 0:53 CDT, Medical Unit Lex NORIEGA MD, Raymundo MUNOZ, Yes.    Notes: Admitted in collaboration with Dr. Pack .

## 2022-04-30 NOTE — ED PROVIDER NOTES
"   Patient:   Joe Nunn            MRN: [id number]            FIN: [id number]               Age:   35 years     Sex:  Male     :  1985   Associated Diagnoses:   Abdominal pain in male; Swallowed foreign body   Author:   Selwyn Castro MD      Basic Information   Additional information: Chief Complaint from Nursing Triage Note : Chief Complaint   2020 19:34 CST      Chief Complaint           To ER with [facility] in full criminal restraints.  States on 02/10 swallowed a wire and "it hasn't passed".  States can feel it "tearing me up inside".  Denies nausea, vomiting, diarrhea, blood etc.  .      History of Present Illness   The patient presents with an ingested foreign body.  The onset was just prior to arrival.  The course/duration of symptoms is constant.  Symptoms: pain.  Location: Patient describes epigastric pain. The type of foreign body is Recent states he swallowed a piece of wire while he was high on meth.  The degree of symptoms is minimal.  The exacerbating factor is eating.  The relieving factor is none.  Risk factors consist of none.  Prior episodes: occasional.  Therapy today: he was brought to the emergency room in police custody.  He is currently in handcuffs.  There's been no nausea no vomiting.  No hematemesis.  No melena no hematochezia.  He describes intermittent epigastric abdominal pain no chest pain no esophageal pain no foreign body sensation.  He is able to handle his own secretions.        Review of Systems   Constitutional symptoms:  Negative except as documented in HPI.   Skin symptoms:  Negative except as documented in HPI.   Eye symptoms:  Negative except as documented in HPI.   ENMT symptoms:  Negative except as documented in HPI.   Respiratory symptoms:  Negative except as documented in HPI.   Cardiovascular symptoms:  Negative except as documented in HPI.   Gastrointestinal symptoms:  Abdominal pain, mild, epigastric, no nausea, no vomiting, no diarrhea, no " constipation, no rectal bleeding, no rectal pain.    Genitourinary symptoms:  Negative except as documented in HPI.   Musculoskeletal symptoms:  Negative except as documented in HPI.   Neurologic symptoms:  Negative except as documented in HPI.   Psychiatric symptoms:  Negative except as documented in HPI.   Endocrine symptoms:  Negative except as documented in HPI.   Hematologic/Lymphatic symptoms:  Negative except as documented in HPI.   Allergy/immunologic symptoms:  Negative except as documented in HPI.             Additional review of systems information: All other systems reviewed and otherwise negative.      Health Status   Allergies:    Allergic Reactions (Selected)  Severity Not Documented  Sulfa drugs- No reactions were documented.  Zofran- Rash and hives.,    Allergies (2) Active Reaction  sulfa drugs None Documented  Zofran rash and hives  .   Medications:  (Selected)   Inpatient Medications  Ordered  lidocaine 1% injectable solution: 10 mL, form: Injection, Subcutaneous, Once, first dose 04/10/18 1:30:00 CDT, stop date 04/10/18 1:30:00 CDT  Pending Complete  potassium chloride 10 mEq/100 mL intravenous solution: 10 mEq, form: Soln, IV Piggyback, q1hr, Infuse over: 1 hr, Order duration: 4 dose(s), first dose 04/09/18 17:00:00 CDT, stop date 04/09/18 20:59:00 CDT, 40 mEq ( x4 10meq, run each over 1 hour)  Prescriptions  Prescribed  Dilantin 100 mg oral capsule, extended release: 200 mg = 2 cap(s), Oral, BID, # 120 cap(s), 0 Refill(s)  Keppra 1000 mg oral tablet: 1,000 mg = 1 tab(s), Oral, BID, # 60 tab(s), 0 Refill(s)  Zoloft 50 mg oral tablet: 50 mg = 1 tab(s), Oral, Daily, # 30 tab(s), 0 Refill(s)  gabapentin 600 mg oral tablet: 600 mg = 1 tab(s), Oral, BID, # 60 tab(s), 0 Refill(s).      Past Medical/ Family/ Social History   Medical history:    Active  Bipolar 1 disorder (296.7)  Seizure disorder (345.90).   Surgical history:    No active procedure history items have been selected or recorded..    Family history:    No family history items have been selected or recorded..   Social history:    Social & Psychosocial Habits    Alcohol  06/12/2015 Risk Assessment: High Risk    11/12/2017  Use: Current    Type: Beer    Frequency: 1-2 times per week    Substance Use  06/12/2015 Risk Assessment: Denies Substance Abuse    11/12/2017  Use: Never    Tobacco  06/01/2015 Risk Assessment: High Risk    02/27/2020  Use: 10 or more cigarettes (1/    Patient Wants Consult For Cessation Counseling No    Abuse/Neglect  02/27/2020  SHX Any signs of abuse or neglect No  .   Problem list:    Active Problems (6)  Bipolar 1 disorder   Knowledge deficit   Malingering   Obesity   Seizure disorder   Tobacco user   .      Physical Examination               Vital Signs   Vital Signs   2/27/2020 19:34 CST      Temperature Oral          36.8 DegC                             Temperature Oral (calculated)             98.24 DegF                             Peripheral Pulse Rate     89 bpm                             Respiratory Rate          17 br/min                             SpO2                      99 %                             Oxygen Therapy            Room air                             Systolic Blood Pressure   145 mmHg  HI                             Diastolic Blood Pressure  108 mmHg  HI  .      Vital Signs (last 24 hrs)_____  Last Charted___________  Temp Oral     36.8 DegC  (FEB 27 19:34)  Heart Rate Peripheral   89 bpm  (FEB 27 19:34)  Resp Rate         17 br/min  (FEB 27 19:34)  SBP      H 145mmHg  (FEB 27 19:34)  DBP      H 108mmHg  (FEB 27 19:34)  SpO2      99 %  (FEB 27 19:34)  .   Measurements   2/27/2020 19:34 CST      Weight Dosing             95 kg                             Weight Measured and Calculated in Lbs     209.44 lb                             Weight Estimated          95 kg                             Height/Length Estimated   167.74 cm                             Body Mass Index Estimated 33.76 kg/m2   .   Basic Oxygen Information   2/27/2020 19:34 CST      SpO2                      99 %                             Oxygen Therapy            Room air  .   General:  Alert, no acute distress.    Skin:  Warm, moist, no pallor, no rash, normal for ethnicity.    Head:  Normocephalic, atraumatic.    Neck:  Supple, trachea midline, no tenderness, no carotid bruit.    Eye:  Pupils are equal, round and reactive to light, extraocular movements are intact, normal conjunctiva.    Ears, nose, mouth and throat:  Tympanic membranes clear, oral mucosa moist, no pharyngeal erythema or exudate.    Cardiovascular:  Regular rate and rhythm, No murmur, Normal peripheral perfusion, No edema.    Respiratory:  Lungs are clear to auscultation, respirations are non-labored, breath sounds are equal, Symmetrical chest wall expansion.    Chest wall:  No tenderness, No deformity.    Back:  Nontender, Normal range of motion, Normal alignment, no step-offs.    Musculoskeletal:  Normal ROM, normal strength, no tenderness, no swelling, no deformity.    Gastrointestinal:  Soft, Nontender, Non distended, Normal bowel sounds, No organomegaly, stool is guaiac negative and  is OK.    Genitourinary:  No tenderness, no discharge, normal external genitalia, no lesions.    Neurological:  No focal neurological deficit observed, normal sensory observed, normal motor observed, normal speech observed, normal coordination observed, Level of consciousness: Appropriate for age, not slow, Cranial nerves II - XII: Intact.    Lymphatics:  No lymphadenopathy.   Psychiatric:  Cooperative, appropriate mood & affect, normal judgment, non-suicidal.       Medical Decision Making   Differential Diagnosis:  Foreign body, gastrointestinal tract, foreign body, throat, airway obstruction, dyspnea, pharyngeal abrasion, nausea, vomiting, epiglottitis.    Rationale:  he was kept nothing by mouth.  I've ordered an acute abdominal series, X-ray series revealed a  foreign body wire most likely lodged in the proximal duodenum.  Surgical resident was consult to evaluate the patient's at 2049 via telephone.    Documents reviewed:  Emergency department nurses' notes, POLICE documents were reviewed.    Results review:     No qualifying data available.      Reexamination/ Reevaluation   Vital signs   Basic Oxygen Information   2/27/2020 19:34 CST      SpO2                      99 %                             Oxygen Therapy            Room air        Impression and Plan   Diagnosis   Abdominal pain in male (RGL97-ZN R10.9)   Swallowed foreign body ([id number] T18.9XXA)   Plan   Condition: Stable.

## 2022-04-30 NOTE — H&P
Patient:   Joe Nunn            MRN: [id number]            FIN: [id number]               Age:   33 years     Sex:  Male     :  1985   Associated Diagnoses:   None   Author:   Janine Wong MD      Chief Complaint   Brought from residential for seizure-like activity, head injury      History of Present Illness   33-year-old white male with past medical history significant for seizure disorder, bipolar disorder, hypertension was brought in from residential for seizures and head injury.  Patient has been to the ED multiple times in the past for seizures.  Last admission was in January this year.  Patient has a known history of not taking his medication.  On last admission patient was discharged on Dilantin 200 mg twice a day and Keppra 1000 milligrams twice a day.  Patient visited the ER yesterday for seizures as well however was discharged from the ER.  Patient came to the ER again today for the same complaint.  Patient states that he has been having seizures continuously since this morning however the officer in the room states that he never witnessed the seizures in residential.  He states that the patient was found to hit his head voluntarily against walls/glass and got are largely sedation on his forehead.  However the officer reported that he witnessed 3 seizures in the ER.  He describes the seizures as entire shaking of the body, with frothing during one seizure episode, no urinary or stool incontinence, no post ictal confusion, no tongue biting.  CT head in the ER showed hematoma overlying the frontal bone otherwise no acute findings.  X-ray chest was unremarkable.  Labs revealed hypokalemia with potassium 3.2 otherwise unremarkable.  Eaten or level negative, UDS negative, Dilantin level was subtherapeutic.  Patient received stitches of the laceration of the forehead in the ER.  Patient was given 1000 mg of Keppra and 4 mg of Ativan in the ER.    Past medical history: Seizure disorder, bipolar disorder,  hypertension    Past surgical history: Both upper extremities facsciotomies    Family history: Mom had a history of seizure disorder    Social history: Denies smoking, alcohol, or IV drug use (however per EMR he is a smoker and drinks alcohol as well)    Allergies: Sulfa drugs, Zofran-hives    Medications: Dilantin 200 mg twice a day, Keppra 1000 mg twice a day, Aripripazole 5 mg daily, gabapentin 600 mg twice a day, hydrochlorothiazide lisinopril      Review of Systems   Constitutional:  No fever, No chills, No sweats.    Eye:  No icterus, No blurring.    Ear/Nose/Mouth/Throat:  No nasal congestion, No sore throat.    Respiratory:  No shortness of breath, No cough.    Cardiovascular:  No chest pain, No syncope.    Gastrointestinal:  Nausea, No vomiting, No diarrhea, No constipation.    Genitourinary:  No dysuria.    Musculoskeletal:  No neck pain.    Integumentary:  No rash, No pruritus.    Neurologic:  Alert and oriented X4, seizures, No abnormal balance, No headache.    Psychiatric:  No anxiety, No depression.       Physical Examination      Vital Signs (last 24 hrs)_____  Last Charted___________  Temp Oral     36.9 DegC  (APR 09 17:27)  Heart Rate Peripheral   81 bpm  (APR 09 18:00)  Resp Rate         20 br/min  (APR 09 18:00)  SBP      110 mmHg  (APR 09 18:00)  DBP      66 mmHg  (APR 09 18:00)  SpO2      98 %  (APR 09 18:00)  Weight      113 kg  (APR 09 12:09)  Height      167 cm  (APR 09 12:09)  BMI      40.52  (APR 09 12:09)     General:  Alert and oriented, No acute distress.    Eye:  Pupils are equal, round and reactive to light, Extraocular movements are intact.    HENT:  Normocephalic, Oral mucosa is moist.    Neck:  Supple, Non-tender, No carotid bruit.    Respiratory:  Lungs are clear to auscultation, Respirations are non-labored.    Cardiovascular:  Normal rate, Regular rhythm, No murmur.    Gastrointestinal:  Soft, Non-tender.    Genitourinary:  No costovertebral angle tenderness.    Musculoskeletal:   Normal range of motion, Normal strength.    Integumentary:  Warm, Dry.    Neurologic:  Alert, Oriented, Normal sensory, Normal motor function, No focal deficits, Cranial Nerves II-XII are grossly intact.    Cognition and Speech:  Oriented, Speech clear and coherent.    Psychiatric:  Cooperative.       Review / Management   Results review:     Labs (Last four charted values)  Glucose              99 (APR 09) .    Laboratory Results   Today's Lab Results : Power[facility] Discrete Results   4/9/2018 13:00 CDT       WBC                       9.4 x10(3)/mcL                             RBC                       4.33 x10(6)/mcL  LOW                             Hgb                       13.5 gm/dL                             Hct                       40.5 %                             Platelet                  263 x10(3)/mcL                             MCV                       93.5 fL                             MCH                       31.2 pg                             MCHC                      33.3 gm/dL                             RDW                       13.8 %                             MPV                       9.1 fL                             Abs Neut                  6.74 x10(3)/mcL                             Neutro Auto               72 x10(3)/mcL  NA                             Lymph Auto                18 %                             Mono Auto                 8 %                             Eos Auto                  1  NA                             Abs Eos                   0.06 x10(3)/mcL  NA                             Basophil Auto             1  NA                             Abs Neutro                6.74 x10(3)/mcL  NA                             Abs Lymph                 1.72 x10(3)/mcL  NA                             Abs Mono                  0.78 x10(3)/mcL  NA                             Abs Baso                  0.08 x10(3)/mcL  NA                             IG%                       0 %  NA                              IG#                       0.0300  NA                             Sodium Lvl                138 mmol/L                             Potassium Lvl             3.2 mmol/L  LOW                             Chloride                  106 mmol/L                             CO2                       27 mmol/L                             Calcium Lvl               8.6 mg/dL                             Magnesium Lvl             2.0 mg/dL                             Glucose Lvl               99 mg/dL                             BUN                       10 mg/dL                             Creatinine                0.80 mg/dL                             eGFR-AA                   >105 mL/min                             eGFR-JUAN                  >105 mL/min                             Bili Total                0.3 mg/dL                             Bili Direct               0.1 mg/dL                             Bili Indirect             0.2 mg/dL                             AST                       30 unit/L                             ALT                       64 unit/L                             Alk Phos                  128 unit/L  HI                             Total Protein             7.1 gm/dL                             Albumin Lvl               3.4 gm/dL                             Globulin                  3.70 gm/mL  HI                             A/G Ratio                 1 ratio                             Phosphorus                2.5 mg/dL                             Total CK                  307 unit/L                             CK MB                     1.1 ng/mL                             Ethanol Lvl               <3.0 mg/dL        Radiology results   Rad Results (ST)   Accession: [id number]  Order: CT Head W/O Contrast  Report Dt/Tm: 04/09/2018 13:33  Report:   Clinical History:  Seizure     Technique:  Axial CT of the brain were obtained without contrast. There are  osseous  reconstructed images available for review with coronal and  sagittal reconstructions.     Automatic exposure control was utilized to reduce the patient's  radiation dose.     Comparison:  January 21, 2018     Findings:  No acute intracranial hemorrhage, edema or mass. No acute parenchymal  abnormality.  There is no hydrocephalus, evidence of herniation or midline shift.  The ventricles and sulci are normal.   There is normal gray white differentiation.   Hematoma and apparent laceration overlies the frontal bone no  underlying osseous abnormality.   The mastoid air cells are clear.   The auditory canals are patent bilaterally.  The globes and orbital contents are normal bilaterally.  The visualized maxillary, ethmoid and sphenoid sinuses are clear.     Impression  No acute intracranial abnormality identified.  Hematoma and apparent laceration overlies the frontal bone no  underlying osseous abnormality.      Accession: [id number]  Order: XR Chest 1 View  Report Dt/Tm: 04/09/2018 13:21  Report:      CLINICAL: Seizure.     COMPARISON: January 20, 2018.                       FINDINGS: Cardiopericardial silhouette is within normal limits. Left  upper lung lobe small calcified granuloma. Lungs are without dense  focal or segmental consolidation, congestive process, pleural  effusions or pneumothorax.       IMPRESSION:     NO ACUTE CARDIOPULMONARY PROCESS IDENTIFIED.             Impression and Plan   1. Seizure disorder  2. Bipolar disorder  3. HTN  4. Hypokalemia  5. Medication noncompliance    Patient admitted to telemetry.  Was brought in due to seizures and with a large the sedation on the forehead, patient received stitches in the ER.  CT head no acute findings except hematoma overlying the frontal bone.  Patient has a history of medication noncompliance.  EEG on last admission was normal.  Patient had 3 seizures in the ER.  Was given 1000 mg of Keppra and 4 mg of Ativan.  Phenytoin level is subtherapeutic.  We  will get a Keppra level.  We will start the patient on Keppra 1000 twice a day and Dilantin 200 mg twice a day.  EEG ordered for tomorrow.  Speech, physical therapy consulted.  Neuro checks every 2 hours.  Replacing potassium.  We will resume other home medications.  Patient was counseled extensively on medication compliance.    Lovenox for DVT prophylaxis    Disposition: Admitted to telemetry.  Continue seizure medications.  Continue neuro checks.  EEG tomorrow.
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
## 2023-04-07 PROBLEM — F17.210 CIGARETTE NICOTINE DEPENDENCE WITHOUT COMPLICATION: Status: ACTIVE | Noted: 2023-04-07

## 2024-12-18 PROBLEM — Z86.59 HISTORY OF BIPOLAR DISORDER: Status: ACTIVE | Noted: 2024-12-18

## 2024-12-18 PROBLEM — Z98.890 S/P EXPLORATORY LAPAROTOMY: Status: ACTIVE | Noted: 2024-12-18

## 2024-12-18 PROBLEM — F19.11 HISTORY OF SUBSTANCE ABUSE: Status: ACTIVE | Noted: 2024-12-18

## 2024-12-18 PROBLEM — K56.609 SMALL BOWEL OBSTRUCTION: Status: ACTIVE | Noted: 2024-12-18

## 2024-12-18 PROBLEM — E66.01 SEVERE OBESITY (BMI >= 40): Status: ACTIVE | Noted: 2024-12-18

## 2025-01-28 ENCOUNTER — TELEPHONE (OUTPATIENT)
Dept: SMOKING CESSATION | Facility: CLINIC | Age: 40
End: 2025-01-28
Payer: MEDICAID

## 2025-01-28 NOTE — TELEPHONE ENCOUNTER
Called patient to confirm clinic intake appointment for smoking cessation on 1/29/25. No answer. Left a voice message with my contact information.

## 2025-01-29 ENCOUNTER — TELEPHONE (OUTPATIENT)
Dept: SMOKING CESSATION | Facility: CLINIC | Age: 40
End: 2025-01-29
Payer: MEDICAID

## 2025-01-29 NOTE — TELEPHONE ENCOUNTER
Patient has not arrived for clinic appointment regarding smoking cessation. Called patient to confirm or reschedule intake appointment for smoking cessation. No answer. Left a voice message with my contact information.